# Patient Record
Sex: FEMALE | Race: OTHER | HISPANIC OR LATINO | Employment: FULL TIME | URBAN - METROPOLITAN AREA
[De-identification: names, ages, dates, MRNs, and addresses within clinical notes are randomized per-mention and may not be internally consistent; named-entity substitution may affect disease eponyms.]

---

## 2018-09-25 ENCOUNTER — HOSPITAL ENCOUNTER (EMERGENCY)
Facility: HOSPITAL | Age: 58
Discharge: HOME | End: 2018-09-25
Attending: EMERGENCY MEDICINE
Payer: OTHER MISCELLANEOUS

## 2018-09-25 VITALS
DIASTOLIC BLOOD PRESSURE: 61 MMHG | HEART RATE: 71 BPM | OXYGEN SATURATION: 98 % | WEIGHT: 165.38 LBS | TEMPERATURE: 98 F | SYSTOLIC BLOOD PRESSURE: 115 MMHG | RESPIRATION RATE: 18 BRPM

## 2018-09-25 DIAGNOSIS — Z51.89 VISIT FOR WOUND CHECK: Primary | ICD-10-CM

## 2018-09-25 PROCEDURE — 99283 EMERGENCY DEPT VISIT LOW MDM: CPT

## 2018-09-25 RX ORDER — LEVOTHYROXINE SODIUM 125 UG/1
125 TABLET ORAL DAILY
COMMUNITY
End: 2019-03-26

## 2018-09-25 RX ORDER — CLINDAMYCIN HYDROCHLORIDE 150 MG/1
300 CAPSULE ORAL 4 TIMES DAILY
Qty: 32 CAPSULE | Refills: 0 | Status: SHIPPED | OUTPATIENT
Start: 2018-09-25 | End: 2018-09-29

## 2018-09-25 NOTE — ED PROVIDER NOTES
Encounter Date: 9/25/2018  SCRIBE #1 NOTE: I, Azar An, am scribing for, and in the presence of,  Tyree Bill MD. I have scribed the entire note.        History     Chief Complaint   Patient presents with    Wound Check     cut middle finger on the left hand sunday        The patient is a 58 y.o. female with co-morbidities including: hypothyroidism who presents to the ED with a complaint of wound to middle finger stating about 2 days ago. The patient was using a cheese grater when she sliced her finger. She reports she has been using antibiotic cream and glue at home on the area. She was not evaluated by a physician at that time. Since the event she wanted a recheck of the area but has not had any significant pain, or any other complaints.            Review of patient's allergies indicates:  No Known Allergies  Past Medical History:   Diagnosis Date    Hypothyroidism      No past surgical history on file.  No family history on file.  Social History     Tobacco Use    Smoking status: Not on file   Substance Use Topics    Alcohol use: Not on file    Drug use: Not on file     Review of Systems   Constitutional: Negative for fever.   HENT: Negative for sore throat.    Respiratory: Negative for shortness of breath.    Cardiovascular: Negative for chest pain.   Gastrointestinal: Negative for nausea.   Genitourinary: Negative for dysuria.   Musculoskeletal: Negative for back pain.   Skin: Positive for wound. Negative for rash.   Neurological: Negative for weakness.   Hematological: Does not bruise/bleed easily.       Physical Exam     Initial Vitals [09/25/18 1236]   BP Pulse Resp Temp SpO2   115/61 71 18 97.7 °F (36.5 °C) 98 %      MAP       --         Physical Exam    Nursing note and vitals reviewed.  Constitutional: She appears well-developed and well-nourished.   HENT:   Head: Normocephalic and atraumatic.   Eyes: Conjunctivae are normal.   Musculoskeletal: Normal range of motion.   Neurological: She is  alert and oriented to person, place, and time. She has normal strength. No sensory deficit.   Skin: Skin is warm and dry.   healing irregular laceration to left middle finger no surrounding erythema or purulence healing   Psychiatric: She has a normal mood and affect. Her behavior is normal.         ED Course   Procedures  Labs Reviewed - No data to display       Imaging Results    None          Medical Decision Making:   Initial Assessment:   Patient presents for evaluation of wound on her finger that she cut several days ago.  She has been applying antibiotic ointment and use glue to close the wound at home.  The finger does not look infected.  Tetanus is up-to-date.  It appears to be healing well without signs of infection.  She will be placed on prophylactic antibiotics.  Return precautions discussed.  Wound care precautions discussed.  appropriately.                             Clinical Impression:   The encounter diagnosis was Visit for wound check.              I, Fly Doyle, personally performed the services described in this documentation. All medical record entries made by the scribe were at my direction and in my presence.  I have reviewed the chart and agree that the record reflects my personal performance and is accurate and complete. Tyree Bill MD.  10:25 PM 09/25/2018                 Tyree Bill MD  09/25/18 8402

## 2019-02-22 ENCOUNTER — DOCUMENTATION ONLY (OUTPATIENT)
Dept: FAMILY MEDICINE | Facility: CLINIC | Age: 59
End: 2019-02-22

## 2019-02-22 ENCOUNTER — OFFICE VISIT (OUTPATIENT)
Dept: FAMILY MEDICINE | Facility: CLINIC | Age: 59
End: 2019-02-22
Payer: COMMERCIAL

## 2019-02-22 ENCOUNTER — HOSPITAL ENCOUNTER (OUTPATIENT)
Dept: RADIOLOGY | Facility: CLINIC | Age: 59
Discharge: HOME | End: 2019-02-22
Attending: PHYSICIAN ASSISTANT
Payer: COMMERCIAL

## 2019-02-22 VITALS
HEIGHT: 62 IN | SYSTOLIC BLOOD PRESSURE: 113 MMHG | BODY MASS INDEX: 29.74 KG/M2 | HEART RATE: 58 BPM | DIASTOLIC BLOOD PRESSURE: 67 MMHG | TEMPERATURE: 98 F | WEIGHT: 161.63 LBS

## 2019-02-22 DIAGNOSIS — J20.9 BRONCHITIS WITH BRONCHOSPASM: ICD-10-CM

## 2019-02-22 DIAGNOSIS — J20.9 BRONCHITIS WITH BRONCHOSPASM: Primary | ICD-10-CM

## 2019-02-22 PROCEDURE — 99999 PR PBB SHADOW E&M-EST. PATIENT-LVL III: ICD-10-PCS | Mod: PBBFAC,,, | Performed by: PHYSICIAN ASSISTANT

## 2019-02-22 PROCEDURE — 3008F BODY MASS INDEX DOCD: CPT | Mod: CPTII,S$GLB,, | Performed by: PHYSICIAN ASSISTANT

## 2019-02-22 PROCEDURE — 99213 OFFICE O/P EST LOW 20 MIN: CPT | Mod: 25,S$GLB,, | Performed by: PHYSICIAN ASSISTANT

## 2019-02-22 PROCEDURE — 94640 PR INHAL RX, AIRWAY OBST/DX SPUTUM INDUCT: ICD-10-PCS | Mod: 59,S$GLB,, | Performed by: PHYSICIAN ASSISTANT

## 2019-02-22 PROCEDURE — 99213 PR OFFICE/OUTPT VISIT, EST, LEVL III, 20-29 MIN: ICD-10-PCS | Mod: 25,S$GLB,, | Performed by: PHYSICIAN ASSISTANT

## 2019-02-22 PROCEDURE — 96372 PR INJECTION,THERAP/PROPH/DIAG2ST, IM OR SUBCUT: ICD-10-PCS | Mod: S$GLB,,, | Performed by: PHYSICIAN ASSISTANT

## 2019-02-22 PROCEDURE — 71046 X-RAY EXAM CHEST 2 VIEWS: CPT | Mod: TC,FY,PO

## 2019-02-22 PROCEDURE — 99999 PR PBB SHADOW E&M-EST. PATIENT-LVL III: CPT | Mod: PBBFAC,,, | Performed by: PHYSICIAN ASSISTANT

## 2019-02-22 PROCEDURE — 3008F PR BODY MASS INDEX (BMI) DOCUMENTED: ICD-10-PCS | Mod: CPTII,S$GLB,, | Performed by: PHYSICIAN ASSISTANT

## 2019-02-22 PROCEDURE — 94640 AIRWAY INHALATION TREATMENT: CPT | Mod: 59,S$GLB,, | Performed by: PHYSICIAN ASSISTANT

## 2019-02-22 PROCEDURE — 71046 X-RAY EXAM CHEST 2 VIEWS: CPT | Mod: 26,,, | Performed by: RADIOLOGY

## 2019-02-22 PROCEDURE — 96372 THER/PROPH/DIAG INJ SC/IM: CPT | Mod: S$GLB,,, | Performed by: PHYSICIAN ASSISTANT

## 2019-02-22 PROCEDURE — 71046 XR CHEST PA AND LATERAL: ICD-10-PCS | Mod: 26,,, | Performed by: RADIOLOGY

## 2019-02-22 RX ORDER — BETAMETHASONE SODIUM PHOSPHATE AND BETAMETHASONE ACETATE 3; 3 MG/ML; MG/ML
6 INJECTION, SUSPENSION INTRA-ARTICULAR; INTRALESIONAL; INTRAMUSCULAR; SOFT TISSUE
Status: COMPLETED | OUTPATIENT
Start: 2019-02-22 | End: 2019-02-22

## 2019-02-22 RX ORDER — METHYLPREDNISOLONE 4 MG/1
TABLET ORAL
Qty: 1 PACKAGE | Refills: 0 | Status: SHIPPED | OUTPATIENT
Start: 2019-02-22 | End: 2019-03-22 | Stop reason: ALTCHOICE

## 2019-02-22 RX ORDER — ALBUTEROL SULFATE 1.25 MG/3ML
1.25 SOLUTION RESPIRATORY (INHALATION)
Status: DISCONTINUED | OUTPATIENT
Start: 2019-02-22 | End: 2019-02-22

## 2019-02-22 RX ORDER — ALBUTEROL SULFATE 90 UG/1
2 AEROSOL, METERED RESPIRATORY (INHALATION) EVERY 6 HOURS PRN
Qty: 18 G | Refills: 0 | Status: SHIPPED | OUTPATIENT
Start: 2019-02-22 | End: 2019-03-22 | Stop reason: ALTCHOICE

## 2019-02-22 RX ORDER — ALBUTEROL SULFATE 0.83 MG/ML
2.5 SOLUTION RESPIRATORY (INHALATION)
Status: COMPLETED | OUTPATIENT
Start: 2019-02-22 | End: 2019-02-22

## 2019-02-22 RX ORDER — OXYMETAZOLINE HCL 0.05 %
2 SPRAY, NON-AEROSOL (ML) NASAL 2 TIMES DAILY
COMMUNITY
End: 2019-03-22 | Stop reason: ALTCHOICE

## 2019-02-22 RX ORDER — AMOXICILLIN AND CLAVULANATE POTASSIUM 500; 125 MG/1; MG/1
1 TABLET, FILM COATED ORAL 2 TIMES DAILY
Refills: 1 | COMMUNITY
Start: 2019-02-18 | End: 2019-03-22 | Stop reason: ALTCHOICE

## 2019-02-22 RX ADMIN — ALBUTEROL SULFATE 2.5 MG: 0.83 SOLUTION RESPIRATORY (INHALATION) at 04:02

## 2019-02-22 RX ADMIN — BETAMETHASONE SODIUM PHOSPHATE AND BETAMETHASONE ACETATE 6 MG: 3; 3 INJECTION, SUSPENSION INTRA-ARTICULAR; INTRALESIONAL; INTRAMUSCULAR; SOFT TISSUE at 03:02

## 2019-02-22 NOTE — PROGRESS NOTES
Subjective:       Patient ID: Akua Anderson is a 58 y.o. female.    Chief Complaint: Cough    Cough   This is a new problem. The current episode started 1 to 4 weeks ago. The problem has been unchanged. The problem occurs constantly. The cough is productive of purulent sputum. Associated symptoms include a fever, nasal congestion, postnasal drip, rhinorrhea, shortness of breath and wheezing. Pertinent negatives include no chest pain, chills, ear congestion, headaches, rash, sore throat, sweats or weight loss. Nothing aggravates the symptoms. She has tried nothing for the symptoms.      Patient   Review of Systems   Constitutional: Positive for fever. Negative for activity change, appetite change, chills, diaphoresis, fatigue and weight loss.   HENT: Positive for postnasal drip and rhinorrhea. Negative for congestion and sore throat.    Respiratory: Positive for cough, shortness of breath and wheezing. Negative for stridor.    Cardiovascular: Negative.  Negative for chest pain, palpitations and leg swelling.   Gastrointestinal: Negative for abdominal pain, blood in stool, constipation, diarrhea, nausea and vomiting.   Genitourinary: Negative for dysuria, frequency, hematuria and urgency.   Musculoskeletal: Negative.    Skin: Negative.  Negative for color change and rash.   Neurological: Negative for dizziness, syncope and headaches.   Psychiatric/Behavioral: Negative for agitation, behavioral problems and confusion.       Objective:      Physical Exam   Constitutional: Vital signs are normal. She appears well-developed and well-nourished. No distress.   HENT:   Head: Normocephalic and atraumatic.   Right Ear: Hearing, tympanic membrane, external ear and ear canal normal.   Left Ear: Hearing, tympanic membrane, external ear and ear canal normal.   Nose: Mucosal edema and rhinorrhea present.   Mouth/Throat: Uvula is midline and mucous membranes are normal. Posterior oropharyngeal erythema present.    Cardiovascular: Normal rate, regular rhythm, S1 normal, S2 normal and normal heart sounds. Exam reveals no gallop.   No murmur heard.  Pulses:       Radial pulses are 2+ on the right side, and 2+ on the left side.   Pulmonary/Chest: Effort normal. No respiratory distress. She has wheezes in the right upper field and the left upper field. She has no rhonchi.   Skin: Skin is warm and dry. She is not diaphoretic.   Appropriate skin turgor   Psychiatric: She has a normal mood and affect. Her speech is normal and behavior is normal. Judgment and thought content normal. Cognition and memory are normal.       Assessment:       1. Bronchitis with bronchospasm        Plan:       Akua was seen today for cough.    Diagnoses and all orders for this visit:    Bronchitis with bronchospasm  -     X-Ray Chest PA And Lateral; Future  -     Discontinue: albuterol nebulizer solution 1.25 mg  -     betamethasone acetate-betamethasone sodium phosphate injection 6 mg  -     albuterol nebulizer solution 2.5 mg  Augmentin 875/125: take 1 tablet every 12 hours x 10 days  Medrol Dose Pack: take as directed.  Return to clinic if symptoms worsen or do not improve with treatment    Patient will return for follow-up to discuss health maintenance and establishing care.

## 2019-02-22 NOTE — PROGRESS NOTES
Pre-Visit Chart Review  For Appointment Scheduled on 02/22/19    There are no preventive care reminders to display for this patient.

## 2019-03-22 ENCOUNTER — OFFICE VISIT (OUTPATIENT)
Dept: FAMILY MEDICINE | Facility: CLINIC | Age: 59
End: 2019-03-22
Payer: COMMERCIAL

## 2019-03-22 ENCOUNTER — DOCUMENTATION ONLY (OUTPATIENT)
Dept: FAMILY MEDICINE | Facility: CLINIC | Age: 59
End: 2019-03-22

## 2019-03-22 VITALS
SYSTOLIC BLOOD PRESSURE: 110 MMHG | WEIGHT: 159.38 LBS | HEART RATE: 71 BPM | HEIGHT: 62 IN | DIASTOLIC BLOOD PRESSURE: 64 MMHG | BODY MASS INDEX: 29.33 KG/M2 | OXYGEN SATURATION: 97 % | TEMPERATURE: 98 F

## 2019-03-22 DIAGNOSIS — Z11.59 NEED FOR HEPATITIS C SCREENING TEST: ICD-10-CM

## 2019-03-22 DIAGNOSIS — E03.9 HYPOTHYROIDISM, UNSPECIFIED TYPE: ICD-10-CM

## 2019-03-22 DIAGNOSIS — E66.3 OVERWEIGHT: ICD-10-CM

## 2019-03-22 DIAGNOSIS — J30.1 SEASONAL ALLERGIC RHINITIS DUE TO POLLEN: Primary | ICD-10-CM

## 2019-03-22 DIAGNOSIS — Z13.6 ENCOUNTER FOR LIPID SCREENING FOR CARDIOVASCULAR DISEASE: ICD-10-CM

## 2019-03-22 DIAGNOSIS — Z13.220 ENCOUNTER FOR LIPID SCREENING FOR CARDIOVASCULAR DISEASE: ICD-10-CM

## 2019-03-22 PROCEDURE — 99214 OFFICE O/P EST MOD 30 MIN: CPT | Mod: S$GLB,,, | Performed by: PHYSICIAN ASSISTANT

## 2019-03-22 PROCEDURE — 99999 PR PBB SHADOW E&M-EST. PATIENT-LVL III: CPT | Mod: PBBFAC,,, | Performed by: PHYSICIAN ASSISTANT

## 2019-03-22 PROCEDURE — 99999 PR PBB SHADOW E&M-EST. PATIENT-LVL III: ICD-10-PCS | Mod: PBBFAC,,, | Performed by: PHYSICIAN ASSISTANT

## 2019-03-22 PROCEDURE — 3008F BODY MASS INDEX DOCD: CPT | Mod: CPTII,S$GLB,, | Performed by: PHYSICIAN ASSISTANT

## 2019-03-22 PROCEDURE — 3008F PR BODY MASS INDEX (BMI) DOCUMENTED: ICD-10-PCS | Mod: CPTII,S$GLB,, | Performed by: PHYSICIAN ASSISTANT

## 2019-03-22 PROCEDURE — 99214 PR OFFICE/OUTPT VISIT, EST, LEVL IV, 30-39 MIN: ICD-10-PCS | Mod: S$GLB,,, | Performed by: PHYSICIAN ASSISTANT

## 2019-03-22 RX ORDER — LORATADINE 10 MG/1
10 TABLET ORAL DAILY PRN
COMMUNITY

## 2019-03-22 NOTE — PROGRESS NOTES
Subjective:       Patient ID: Akua Anderson is a 58 y.o. female.    Chief Complaint: Follow-up    HPI   Patient is a 58 year old female presenting to the clinic for several concerns. She presents with her son whom is the  as she speaks little English. Patient agrees to this and does not wish to have a . Patient reports she is concerned that she has had waxing and waning cough, runny nose, sinus congestion for over 1 month. Patient was initially evaluated at  in early February. She followed up with our clinic 2/22/19 for similar concerned and what dx with bronchitis. Patient did undergo antibiotic therapy, medrol dose pack, steroid injection, and given albuteorl HFA. Patient reports that she did improve after this treatment. However, she recently went on a cruise and felt poorly again after returning home. She has been taking claritin off and on with some relief. She denies any fever. She has had some mild wheezing with taking deep breaths.    Of note, patient would like to get routine labwork done. She reports seeing ob/gyn in [place] in January and had her mammogram and pap smear done. She reports colonoscopy done about 3 years back in [place]. There is some confusion as when she was supposed to repeat this. She goes back to [place] in July.     Review of Systems   Constitutional: Negative for activity change, appetite change, chills, diaphoresis, fatigue and fever.   HENT: Positive for congestion, sinus pressure and sinus pain. Negative for postnasal drip, rhinorrhea, sore throat, tinnitus and trouble swallowing.    Respiratory: Positive for cough. Negative for shortness of breath and wheezing.    Cardiovascular: Negative.  Negative for chest pain.   Gastrointestinal: Negative for abdominal pain, blood in stool, constipation, diarrhea, nausea and vomiting.   Genitourinary: Negative for dysuria, frequency, hematuria and urgency.   Musculoskeletal: Negative.    Skin: Negative.  Negative  for color change and rash.   Neurological: Negative for dizziness and syncope.   Psychiatric/Behavioral: Negative for agitation, behavioral problems and confusion.       Objective:      Physical Exam   Constitutional: Vital signs are normal. She appears well-developed and well-nourished. No distress.   HENT:   Head: Normocephalic and atraumatic.   Right Ear: Hearing, tympanic membrane, external ear and ear canal normal.   Left Ear: Hearing, tympanic membrane, external ear and ear canal normal.   Nose: Rhinorrhea present. Right sinus exhibits maxillary sinus tenderness and frontal sinus tenderness. Left sinus exhibits maxillary sinus tenderness and frontal sinus tenderness.   Mouth/Throat: Uvula is midline and mucous membranes are normal. Posterior oropharyngeal erythema present.   Cardiovascular: Normal rate, regular rhythm, S1 normal, S2 normal and normal heart sounds. Exam reveals no gallop.   No murmur heard.  Pulses:       Radial pulses are 2+ on the right side, and 2+ on the left side.   <2sec cap refill fingers bilat     Pulmonary/Chest: Effort normal and breath sounds normal. No respiratory distress. She has no wheezes. She has no rhonchi.   Skin: Skin is warm and dry. She is not diaphoretic.   Appropriate skin turgor   Psychiatric: She has a normal mood and affect. Her speech is normal and behavior is normal. Judgment and thought content normal. Cognition and memory are normal.       Assessment:       1. Seasonal allergic rhinitis due to pollen    2. Hypothyroidism, unspecified type    3. Encounter for lipid screening for cardiovascular disease    4. Need for hepatitis C screening test    5. Overweight        Plan:       Akua was seen today for follow-up.    Diagnoses and all orders for this visit:    Seasonal allergic rhinitis due to pollen  Likely related to pollen; patient is somewhat new to the area. She is from [place].  I also feel like there could be a seasonal asthma component, but normal on exam  today.  If cough or wheezing persists, may need PFTs  She has albuteorl inhaler every 4-6 hours PRN    Hypothyroidism, unspecified type  -     CBC auto differential; Future  -     Comprehensive metabolic panel; Future  -     TSH; Future    Encounter for lipid screening for cardiovascular disease  -     CBC auto differential; Future  -     Comprehensive metabolic panel; Future  -     Lipid panel; Future    Need for hepatitis C screening test  -     Hepatitis C antibody; Future    Overweight  -     CBC auto differential; Future  -     Comprehensive metabolic panel; Future  -     TSH; Future        Patient readiness: acceptance and barriers:none    During the course of the visit the patient was educated and counseled about the following:     Obesity:   Regular aerobic exercise program discussed.    Goals: Obesity: Reduce calorie intake and BMI    Did patient meet goals/outcomes: No    The following self management tools provided: declined    Patient Instructions (the written plan) was given to the patient/family.     Time spent with patient: 30 minutes    Barriers to medications present (no )    Adverse reactions to current medications (no)    Over the counter medications reviewed (Yes)    Est care with Dr. Galarza

## 2019-03-22 NOTE — PROGRESS NOTES
Pre-Visit Chart Review  For Appointment Scheduled on 03/22/19    Health Maintenance Due   Topic Date Due    Hepatitis C Screening  1960    Lipid Panel  1960    TETANUS VACCINE  04/30/1977    Pap Smear with HPV Cotest  04/30/1980    Mammogram  04/30/1999    Colonoscopy  04/30/2009    Influenza Vaccine  08/01/2018

## 2019-03-25 ENCOUNTER — LAB VISIT (OUTPATIENT)
Dept: LAB | Facility: HOSPITAL | Age: 59
End: 2019-03-25
Attending: PHYSICIAN ASSISTANT
Payer: COMMERCIAL

## 2019-03-25 DIAGNOSIS — Z11.59 NEED FOR HEPATITIS C SCREENING TEST: ICD-10-CM

## 2019-03-25 DIAGNOSIS — Z13.220 ENCOUNTER FOR LIPID SCREENING FOR CARDIOVASCULAR DISEASE: ICD-10-CM

## 2019-03-25 DIAGNOSIS — Z13.6 ENCOUNTER FOR LIPID SCREENING FOR CARDIOVASCULAR DISEASE: ICD-10-CM

## 2019-03-25 DIAGNOSIS — E66.3 OVERWEIGHT: ICD-10-CM

## 2019-03-25 DIAGNOSIS — E03.9 HYPOTHYROIDISM, UNSPECIFIED TYPE: ICD-10-CM

## 2019-03-25 LAB
ALBUMIN SERPL BCP-MCNC: 3.8 G/DL (ref 3.5–5.2)
ALP SERPL-CCNC: 106 U/L (ref 55–135)
ALT SERPL W/O P-5'-P-CCNC: 37 U/L (ref 10–44)
ANION GAP SERPL CALC-SCNC: 9 MMOL/L (ref 8–16)
AST SERPL-CCNC: 30 U/L (ref 10–40)
BASOPHILS # BLD AUTO: 0.04 K/UL (ref 0–0.2)
BASOPHILS NFR BLD: 0.6 % (ref 0–1.9)
BILIRUB SERPL-MCNC: 0.9 MG/DL (ref 0.1–1)
BUN SERPL-MCNC: 12 MG/DL (ref 6–20)
CALCIUM SERPL-MCNC: 9.8 MG/DL (ref 8.7–10.5)
CHLORIDE SERPL-SCNC: 107 MMOL/L (ref 95–110)
CHOLEST SERPL-MCNC: 226 MG/DL (ref 120–199)
CHOLEST/HDLC SERPL: 5.3 {RATIO} (ref 2–5)
CO2 SERPL-SCNC: 25 MMOL/L (ref 23–29)
CREAT SERPL-MCNC: 0.8 MG/DL (ref 0.5–1.4)
DIFFERENTIAL METHOD: ABNORMAL
EOSINOPHIL # BLD AUTO: 0.3 K/UL (ref 0–0.5)
EOSINOPHIL NFR BLD: 4.1 % (ref 0–8)
ERYTHROCYTE [DISTWIDTH] IN BLOOD BY AUTOMATED COUNT: 13.7 % (ref 11.5–14.5)
EST. GFR  (AFRICAN AMERICAN): >60 ML/MIN/1.73 M^2
EST. GFR  (NON AFRICAN AMERICAN): >60 ML/MIN/1.73 M^2
GLUCOSE SERPL-MCNC: 99 MG/DL (ref 70–110)
HCT VFR BLD AUTO: 41.9 % (ref 37–48.5)
HCV AB SERPL QL IA: NEGATIVE
HDLC SERPL-MCNC: 43 MG/DL (ref 40–75)
HDLC SERPL: 19 % (ref 20–50)
HGB BLD-MCNC: 13.2 G/DL (ref 12–16)
IMM GRANULOCYTES # BLD AUTO: 0.02 K/UL (ref 0–0.04)
IMM GRANULOCYTES NFR BLD AUTO: 0.3 % (ref 0–0.5)
LDLC SERPL CALC-MCNC: 148.8 MG/DL (ref 63–159)
LYMPHOCYTES # BLD AUTO: 1.6 K/UL (ref 1–4.8)
LYMPHOCYTES NFR BLD: 25 % (ref 18–48)
MCH RBC QN AUTO: 27.8 PG (ref 27–31)
MCHC RBC AUTO-ENTMCNC: 31.5 G/DL (ref 32–36)
MCV RBC AUTO: 88 FL (ref 82–98)
MONOCYTES # BLD AUTO: 0.6 K/UL (ref 0.3–1)
MONOCYTES NFR BLD: 8.6 % (ref 4–15)
NEUTROPHILS # BLD AUTO: 4 K/UL (ref 1.8–7.7)
NEUTROPHILS NFR BLD: 61.4 % (ref 38–73)
NONHDLC SERPL-MCNC: 183 MG/DL
NRBC BLD-RTO: 0 /100 WBC
PLATELET # BLD AUTO: 361 K/UL (ref 150–350)
PMV BLD AUTO: 10.9 FL (ref 9.2–12.9)
POTASSIUM SERPL-SCNC: 4.2 MMOL/L (ref 3.5–5.1)
PROT SERPL-MCNC: 7.5 G/DL (ref 6–8.4)
RBC # BLD AUTO: 4.74 M/UL (ref 4–5.4)
SODIUM SERPL-SCNC: 141 MMOL/L (ref 136–145)
T4 FREE SERPL-MCNC: 1.62 NG/DL (ref 0.71–1.51)
TRIGL SERPL-MCNC: 171 MG/DL (ref 30–150)
TSH SERPL DL<=0.005 MIU/L-ACNC: 0.1 UIU/ML (ref 0.4–4)
WBC # BLD AUTO: 6.53 K/UL (ref 3.9–12.7)

## 2019-03-25 PROCEDURE — 84439 ASSAY OF FREE THYROXINE: CPT

## 2019-03-25 PROCEDURE — 80053 COMPREHEN METABOLIC PANEL: CPT

## 2019-03-25 PROCEDURE — 36415 COLL VENOUS BLD VENIPUNCTURE: CPT | Mod: PO

## 2019-03-25 PROCEDURE — 86803 HEPATITIS C AB TEST: CPT

## 2019-03-25 PROCEDURE — 80061 LIPID PANEL: CPT

## 2019-03-25 PROCEDURE — 84443 ASSAY THYROID STIM HORMONE: CPT

## 2019-03-25 PROCEDURE — 85025 COMPLETE CBC W/AUTO DIFF WBC: CPT

## 2019-03-26 ENCOUNTER — TELEPHONE (OUTPATIENT)
Dept: FAMILY MEDICINE | Facility: CLINIC | Age: 59
End: 2019-03-26

## 2019-03-26 DIAGNOSIS — R79.89 DECREASED THYROID STIMULATING HORMONE (TSH) LEVEL: Primary | ICD-10-CM

## 2019-03-26 RX ORDER — LEVOTHYROXINE SODIUM 112 UG/1
112 TABLET ORAL DAILY
Qty: 90 TABLET | Refills: 0 | Status: SHIPPED | OUTPATIENT
Start: 2019-03-26 | End: 2020-03-25

## 2019-03-26 NOTE — TELEPHONE ENCOUNTER
----- Message from Yina Bacon sent at 3/26/2019 12:29 PM CDT -----  Type:  Patient Returning Call    Who Called:  Patient  Who Left Message for Patient: NA  Does the patient know what this is regarding?:  Lab test results  Best Call Back Number:  [phone]  Additional Information: Okay to leave voicemail with information

## 2019-08-21 DIAGNOSIS — M25.562 LEFT KNEE PAIN, UNSPECIFIED CHRONICITY: Primary | ICD-10-CM

## 2019-08-22 ENCOUNTER — OFFICE VISIT (OUTPATIENT)
Dept: ORTHOPEDICS | Facility: CLINIC | Age: 59
End: 2019-08-22
Payer: COMMERCIAL

## 2019-08-22 ENCOUNTER — HOSPITAL ENCOUNTER (OUTPATIENT)
Dept: RADIOLOGY | Facility: HOSPITAL | Age: 59
Discharge: HOME | End: 2019-08-22
Attending: ORTHOPAEDIC SURGERY
Payer: COMMERCIAL

## 2019-08-22 VITALS
SYSTOLIC BLOOD PRESSURE: 111 MMHG | BODY MASS INDEX: 29.26 KG/M2 | WEIGHT: 159 LBS | HEIGHT: 62 IN | DIASTOLIC BLOOD PRESSURE: 63 MMHG | HEART RATE: 79 BPM

## 2019-08-22 DIAGNOSIS — M25.562 LEFT KNEE PAIN, UNSPECIFIED CHRONICITY: ICD-10-CM

## 2019-08-22 DIAGNOSIS — S83.242A TEAR OF MEDIAL MENISCUS OF LEFT KNEE, CURRENT, INITIAL ENCOUNTER: Primary | ICD-10-CM

## 2019-08-22 PROCEDURE — 3008F PR BODY MASS INDEX (BMI) DOCUMENTED: ICD-10-PCS | Mod: CPTII,S$GLB,, | Performed by: ORTHOPAEDIC SURGERY

## 2019-08-22 PROCEDURE — 73562 X-RAY EXAM OF KNEE 3: CPT | Mod: TC,PN,RT

## 2019-08-22 PROCEDURE — 73562 XR KNEE ORTHO LEFT WITH FLEXION: ICD-10-PCS | Mod: 26,59,RT, | Performed by: RADIOLOGY

## 2019-08-22 PROCEDURE — 99204 PR OFFICE/OUTPT VISIT, NEW, LEVL IV, 45-59 MIN: ICD-10-PCS | Mod: S$GLB,,, | Performed by: ORTHOPAEDIC SURGERY

## 2019-08-22 PROCEDURE — 99999 PR PBB SHADOW E&M-EST. PATIENT-LVL III: ICD-10-PCS | Mod: PBBFAC,,, | Performed by: ORTHOPAEDIC SURGERY

## 2019-08-22 PROCEDURE — 73562 X-RAY EXAM OF KNEE 3: CPT | Mod: 26,59,RT, | Performed by: RADIOLOGY

## 2019-08-22 PROCEDURE — 73564 X-RAY EXAM KNEE 4 OR MORE: CPT | Mod: 26,LT,, | Performed by: RADIOLOGY

## 2019-08-22 PROCEDURE — 3008F BODY MASS INDEX DOCD: CPT | Mod: CPTII,S$GLB,, | Performed by: ORTHOPAEDIC SURGERY

## 2019-08-22 PROCEDURE — 99204 OFFICE O/P NEW MOD 45 MIN: CPT | Mod: S$GLB,,, | Performed by: ORTHOPAEDIC SURGERY

## 2019-08-22 PROCEDURE — 99999 PR PBB SHADOW E&M-EST. PATIENT-LVL III: CPT | Mod: PBBFAC,,, | Performed by: ORTHOPAEDIC SURGERY

## 2019-08-22 PROCEDURE — 73564 XR KNEE ORTHO LEFT WITH FLEXION: ICD-10-PCS | Mod: 26,LT,, | Performed by: RADIOLOGY

## 2019-08-22 NOTE — H&P (VIEW-ONLY)
CC:  59-year-old female presents for evaluation of left knee pain.  The patient has had pain in the left knee for about 2 weeks.  She states that she pivoted to turn with her left foot planted and felt and heard a pop in the back of her knee.  She has had pain in the knee since that time this caused her to limp.  She rates her pain as a 7/10.  The knee has continued to pop since initial injury.  It also mechelle and gives way due to the pain.  She denies any locking of the knee.    ROS:    Constitution: Denies chills, fever, and sweats.  HENT: Denies headaches or blurry vision.  Cardiovascular: Denies chest pain or irregular heart beat.  Respiratory: Denies cough or shortness of breath.  Gastrointestinal: Denies abdominal pain, nausea, or vomiting.  Genitourinary:  Denies urinary incontinence, bladder and kidney issues  Musculoskeletal:  Denies muscle cramps.  Positive for left knee pain with popping and buckling of the knee.  Neurological: Denies dizziness or focal weakness.  Psychiatric/Behavioral: Normal mental status.  Hematologic/Lymphatic: Denies bleeding problem or easy bruising/bleeding.  Skin: Denies rash or suspicious lesions.    Physical examination     Gen - No acute distress   Eyes - Extraoccular motions intact, pupils equally round and reactive to light and accommodation   ENT - normocephalic, atruamtic, oropharynx clear   Neck - Supple, no abnormal masses   Cardiovascular - regular rate and rhythm   Pulmonary - clear to auscultation bilaterally   Abdomen - soft, non-tender, non-distended, positive bowel sounds   Psych - The patient is alert and oriented x3 with normal mood and affect    Examination of the Left Lower Extremity:     Motor function is intact distally EHL/FHL/TA/shamika   +2 dorsalis pedis and posterior tibial pulses   Sensation to light touch intact distally dorsal, plantar, and first web space     Examination of the Left knee:    ROM 0 - 150   Effusion positive  Tenderness to palpation at  the joint line positive, mostly over the medial and posterior medial aspect of the left  Pain during range of motion positive  Crepitation during range of motion negative     positive increased pain noted with flexion past 90   positive antalgic gait noted   negative Lachman's Test   negative Anterior Drawer Test   negative Posterior Drawer Test   positive McMurrays Test   positive Disco Test   positive Varus/Valgus instability    X-rays were examined and personally reviewed by me.  The joint space is well maintained.  There is no acute fractures. There is no signs of osteoarthritis.  There does appear to be an effusion on the lateral view.    Dx:  Tear of the medial meniscus of the left knee.    Plan:  Recommendations for left knee arthroscopy with partial medial meniscectomy versus repair.  Risks and benefits were explained and the patient verbalized understanding and wishes to proceed.  Will schedule that for the next available date that is convenient for her.

## 2019-08-22 NOTE — LETTER
August 22, 2019      [facility]            [facility] [place]  [facility] [address]  Phone: [phone]          Patient: Akua Anderson   MR Number: [id number]   YOB: 1960   Date of Visit: 8/22/2019       Dear [facility] :    Thank you for referring Akua Anderson to me for evaluation. Attached you will find relevant portions of my assessment and plan of care.    If you have questions, please do not hesitate to call me. I look forward to following Akua Anderson along with you.    Sincerely,    Nahum Espinosa II, MD    Enclosure  CC:  No Recipients    If you would like to receive this communication electronically, please contact [email or web address] or [phone] to request more information on [employer] access.    For providers and/or their staff who would like to refer a patient to [facility], please contact us through our one-stop-shop provider referral line, [facility] , at [phone].    If you feel you have received this communication in error or would no longer like to receive these types of communications, please e-mail [email or web address]

## 2019-08-28 ENCOUNTER — TELEPHONE (OUTPATIENT)
Dept: ORTHOPEDICS | Facility: CLINIC | Age: 59
End: 2019-08-28

## 2019-08-28 DIAGNOSIS — S83.242A TEAR OF MEDIAL MENISCUS OF LEFT KNEE, CURRENT, INITIAL ENCOUNTER: Primary | ICD-10-CM

## 2019-08-28 NOTE — TELEPHONE ENCOUNTER
Left message. Surgery off calender until MRI results are in. Able to add to 09/04/19 or 09/06/19. Elvi Garrido LPN

## 2019-08-28 NOTE — TELEPHONE ENCOUNTER
----- Message from Diamond Pearson sent at 8/28/2019  3:28 PM CDT -----  Contact: Daughter in law / Elvi Valero / [phone]  Elvi is returning the nurse's call.  She wants to know if the knee scope is still scheduled for this Friday or if it needs to be rescheduled for next Friday depending on how quickly you get the MRI results from tomorrow.  Please call Elvi.

## 2019-08-29 ENCOUNTER — HOSPITAL ENCOUNTER (OUTPATIENT)
Dept: RADIOLOGY | Facility: HOSPITAL | Age: 59
Discharge: HOME | End: 2019-08-29
Attending: ORTHOPAEDIC SURGERY
Payer: COMMERCIAL

## 2019-08-29 ENCOUNTER — HOSPITAL ENCOUNTER (OUTPATIENT)
Dept: PREADMISSION TESTING | Facility: HOSPITAL | Age: 59
Discharge: HOME | End: 2019-08-29
Attending: ORTHOPAEDIC SURGERY
Payer: COMMERCIAL

## 2019-08-29 ENCOUNTER — TELEPHONE (OUTPATIENT)
Dept: ORTHOPEDICS | Facility: CLINIC | Age: 59
End: 2019-08-29

## 2019-08-29 VITALS — WEIGHT: 160 LBS | HEIGHT: 61 IN | BODY MASS INDEX: 30.21 KG/M2

## 2019-08-29 DIAGNOSIS — S83.242A TEAR OF MEDIAL MENISCUS OF LEFT KNEE, CURRENT, INITIAL ENCOUNTER: Primary | ICD-10-CM

## 2019-08-29 DIAGNOSIS — S83.242A TEAR OF MEDIAL MENISCUS OF LEFT KNEE, CURRENT, INITIAL ENCOUNTER: ICD-10-CM

## 2019-08-29 DIAGNOSIS — S83.242A TEAR OF MEDIAL MENISCUS OF LEFT KNEE, CURRENT, UNSPECIFIED TEAR TYPE, INITIAL ENCOUNTER: Primary | ICD-10-CM

## 2019-08-29 DIAGNOSIS — S83.232A COMPLEX TEAR OF MEDIAL MENISCUS OF LEFT KNEE AS CURRENT INJURY, INITIAL ENCOUNTER: ICD-10-CM

## 2019-08-29 LAB
ANION GAP SERPL CALC-SCNC: 6 MMOL/L (ref 8–16)
ANION GAP SERPL CALC-SCNC: 7 MMOL/L (ref 8–16)
BASOPHILS # BLD AUTO: 0.03 K/UL (ref 0–0.2)
BASOPHILS NFR BLD: 0.4 % (ref 0–1.9)
BUN SERPL-MCNC: 20 MG/DL (ref 6–20)
BUN SERPL-MCNC: 20 MG/DL (ref 6–20)
CALCIUM SERPL-MCNC: 10.3 MG/DL (ref 8.7–10.5)
CALCIUM SERPL-MCNC: 10.5 MG/DL (ref 8.7–10.5)
CHLORIDE SERPL-SCNC: 105 MMOL/L (ref 95–110)
CHLORIDE SERPL-SCNC: 106 MMOL/L (ref 95–110)
CO2 SERPL-SCNC: 30 MMOL/L (ref 23–29)
CO2 SERPL-SCNC: 31 MMOL/L (ref 23–29)
CREAT SERPL-MCNC: 0.8 MG/DL (ref 0.5–1.4)
CREAT SERPL-MCNC: 0.9 MG/DL (ref 0.5–1.4)
DIFFERENTIAL METHOD: ABNORMAL
EOSINOPHIL # BLD AUTO: 0.3 K/UL (ref 0–0.5)
EOSINOPHIL NFR BLD: 3.3 % (ref 0–8)
ERYTHROCYTE [DISTWIDTH] IN BLOOD BY AUTOMATED COUNT: 14.2 % (ref 11.5–14.5)
EST. GFR  (AFRICAN AMERICAN): >60 ML/MIN/1.73 M^2
EST. GFR  (AFRICAN AMERICAN): >60 ML/MIN/1.73 M^2
EST. GFR  (NON AFRICAN AMERICAN): >60 ML/MIN/1.73 M^2
EST. GFR  (NON AFRICAN AMERICAN): >60 ML/MIN/1.73 M^2
GLUCOSE SERPL-MCNC: 102 MG/DL (ref 70–110)
GLUCOSE SERPL-MCNC: 102 MG/DL (ref 70–110)
HCT VFR BLD AUTO: 45.5 % (ref 37–48.5)
HGB BLD-MCNC: 14.4 G/DL (ref 12–16)
IMM GRANULOCYTES # BLD AUTO: 0.02 K/UL (ref 0–0.04)
LYMPHOCYTES # BLD AUTO: 2.1 K/UL (ref 1–4.8)
LYMPHOCYTES NFR BLD: 24.8 % (ref 18–48)
MCH RBC QN AUTO: 27.8 PG (ref 27–31)
MCHC RBC AUTO-ENTMCNC: 31.6 G/DL (ref 32–36)
MCV RBC AUTO: 88 FL (ref 82–98)
MONOCYTES # BLD AUTO: 0.7 K/UL (ref 0.3–1)
MONOCYTES NFR BLD: 8 % (ref 4–15)
NEUTROPHILS # BLD AUTO: 5.2 K/UL (ref 1.8–7.7)
NEUTROPHILS NFR BLD: 63.3 % (ref 38–73)
NRBC BLD-RTO: 0 /100 WBC
PLATELET # BLD AUTO: 293 K/UL (ref 150–350)
PMV BLD AUTO: 10.6 FL (ref 9.2–12.9)
POTASSIUM SERPL-SCNC: 5.1 MMOL/L (ref 3.5–5.1)
POTASSIUM SERPL-SCNC: 5.2 MMOL/L (ref 3.5–5.1)
RBC # BLD AUTO: 5.18 M/UL (ref 4–5.4)
SODIUM SERPL-SCNC: 142 MMOL/L (ref 136–145)
SODIUM SERPL-SCNC: 143 MMOL/L (ref 136–145)
WBC # BLD AUTO: 8.27 K/UL (ref 3.9–12.7)

## 2019-08-29 PROCEDURE — 73721 MRI KNEE WITHOUT CONTRAST LEFT: ICD-10-PCS | Mod: 26,LT,, | Performed by: RADIOLOGY

## 2019-08-29 PROCEDURE — 80048 BASIC METABOLIC PNL TOTAL CA: CPT

## 2019-08-29 PROCEDURE — 93010 ELECTROCARDIOGRAM REPORT: CPT | Mod: ,,, | Performed by: INTERNAL MEDICINE

## 2019-08-29 PROCEDURE — 93005 ELECTROCARDIOGRAM TRACING: CPT

## 2019-08-29 PROCEDURE — 80048 BASIC METABOLIC PNL TOTAL CA: CPT | Mod: 91

## 2019-08-29 PROCEDURE — 93010 EKG 12-LEAD: ICD-10-PCS | Mod: ,,, | Performed by: INTERNAL MEDICINE

## 2019-08-29 PROCEDURE — 99900104 DSU ONLY-NO CHARGE-EA ADD'L HR (STAT)

## 2019-08-29 PROCEDURE — 85025 COMPLETE CBC W/AUTO DIFF WBC: CPT

## 2019-08-29 PROCEDURE — 73721 MRI JNT OF LWR EXTRE W/O DYE: CPT | Mod: TC,LT

## 2019-08-29 PROCEDURE — 73721 MRI JNT OF LWR EXTRE W/O DYE: CPT | Mod: 26,LT,, | Performed by: RADIOLOGY

## 2019-08-29 PROCEDURE — 99900103 DSU ONLY-NO CHARGE-INITIAL HR (STAT)

## 2019-08-29 PROCEDURE — 36415 COLL VENOUS BLD VENIPUNCTURE: CPT

## 2019-08-29 RX ORDER — CEFAZOLIN SODIUM 2 G/50ML
2 SOLUTION INTRAVENOUS
Status: CANCELLED | OUTPATIENT
Start: 2019-08-29

## 2019-08-29 RX ORDER — MUPIROCIN 20 MG/G
OINTMENT TOPICAL
Status: CANCELLED | OUTPATIENT
Start: 2019-08-29

## 2019-08-29 NOTE — DISCHARGE INSTRUCTIONS
To confirm, Your doctor has instructed you that surgery is scheduled for: 9/4/19   Araseli  [phone]    Please report to [facility] the morning of surgery. You must check-in and receive a wristband before going to your procedure.    Pre-Op will call the afternoon prior to surgery between 1:00 and 6:00 PM with the final arrival time.  Phone number: [phone]    PLEASE NOTE:  The surgery schedule has many variables which may affect the time of your surgery case.  Family members should be available if your surgery time changes.  Plan to be here the day of your procedure between 4-6 hours.    MEDICATIONS:  TAKE ONLY THESE MEDICATIONS WITH A SMALL SIP OF WATER THE MORNING OF YOUR PROCEDURE:    Levothyroxine,      Claritin - if needed    DO NOT TAKE THESE MEDICATIONS 5-7 DAYS PRIOR to your procedure or per your surgeon's request: ASPIRIN, ALEVE, ADVIL, IBUPROFEN, FISH OIL VITAMIN E, HERBALS  (May take Tylenol)                                    Glucosamine - last dose -  8/29/19    ONLY if you are prescribed any types of blood thinners such as:  Aspirin, Coumadin, Plavix, Pradaxa, Xarelto, Aggrenox, Effient, Eliquis, Savasya, Brilinta, or any other, ask your surgeon whether you should stop taking them and how long before surgery you should stop.  You may also need to verify with the prescribing physician if it is ok to stop your medication.      INSTRUCTIONS IMPORTANT!!  · Do not eat or drink anything between midnight and the time of your procedure- this includes gum, mints, and candy.  · Do not smoke or drink alcoholic beverages 24 hours prior to your procedure.  · Shower the night before AND the morning of your procedure with a Chlorhexidine wash such as Hibiclens or Dial antibacterial soap from the neck down.  Do not get it on your face or in your eyes.  You may use your own shampoo and face wash. This helps your skin to be as bacteria free as possible.    · If you wear contact  lenses, dentures, hearing aids or glasses, bring a container to put them in during surgery and give to a family member for safe keeping.  Please leave all jewelry, piercing's and valuables at home.   · DO NOT remove hair from the surgery site.  Do not shave the incision site unless you are given specific instructions to do so.    · ONLY if you have been diagnosed with sleep apnea please bring your C-PAP machine.  · ONLY if you wear home oxygen please bring your portable oxygen tank the day of your procedure.  · ONLY if you have a history of OPEN HEART SURGERY you will need a clearance from your Cardiologist per Anesthesia.      · ONLY for patients requiring bowel prep, written instructions will be given by your doctor's office.  · ONLY if you have a neuro stimulator, please bring the controller with you the morning of surgery  · ONLY if a type and screen test is needed before surgery, please return:  · If your doctor has scheduled you for an overnight stay, bring a small overnight bag with any personal items you need.  · Make arrangements in advance for transportation home by a responsible adult.  It is not safe to drive a vehicle during the 24 hours after anesthesia.      · Visiting hours are 10:00AM to 8:30PM.  For the safety of all patients, visitors under the age of 12 are not allowed above the first floor of the hospital.    · All [facility] facilities and properties are tobacco free.  Smoking is NOT allowed.       If you have any questions about these instructions, call Pre-Op Admit  Nursing at [phone] or the Pre-Op Day Surgery Unit at [phone].

## 2019-08-29 NOTE — PRE-PROCEDURE INSTRUCTIONS
Pt. Speaks [place], but can understand English.  Daughter with be with Mother who speaks [place].  Pt was asked if she needed an , but denied needing one.

## 2019-08-29 NOTE — TELEPHONE ENCOUNTER
----- Message from Nayla Mcclure MA sent at 8/29/2019  8:21 AM CDT -----  Contact: pt   Calling to schedule surgery   Call back

## 2019-08-30 ENCOUNTER — TELEPHONE (OUTPATIENT)
Dept: ORTHOPEDICS | Facility: CLINIC | Age: 59
End: 2019-08-30

## 2019-08-30 NOTE — TELEPHONE ENCOUNTER
Case reopened for reconsideration now that the MRI has been completed. Wait for reply. Elvi Garrido LPN

## 2019-08-30 NOTE — TELEPHONE ENCOUNTER
----- Message from Nayla Mcclure MA sent at 8/30/2019  8:13 AM CDT -----  Contact: [facility], [facility]   or any one that answers    Wants to schedule peer to peer   Call back   Option 4  Case number   [phone]

## 2019-09-03 ENCOUNTER — ANESTHESIA EVENT (OUTPATIENT)
Dept: SURGERY | Facility: HOSPITAL | Age: 59
End: 2019-09-03
Payer: COMMERCIAL

## 2019-09-04 ENCOUNTER — ANESTHESIA (OUTPATIENT)
Dept: SURGERY | Facility: HOSPITAL | Age: 59
End: 2019-09-04
Payer: COMMERCIAL

## 2019-09-04 ENCOUNTER — HOSPITAL ENCOUNTER (OUTPATIENT)
Facility: HOSPITAL | Age: 59
Discharge: HOME | End: 2019-09-04
Attending: ORTHOPAEDIC SURGERY | Admitting: ORTHOPAEDIC SURGERY
Payer: COMMERCIAL

## 2019-09-04 DIAGNOSIS — S83.232A COMPLEX TEAR OF MEDIAL MENISCUS OF LEFT KNEE AS CURRENT INJURY, INITIAL ENCOUNTER: Primary | ICD-10-CM

## 2019-09-04 DIAGNOSIS — S83.242A TEAR OF MEDIAL MENISCUS OF LEFT KNEE, CURRENT, INITIAL ENCOUNTER: ICD-10-CM

## 2019-09-04 DIAGNOSIS — S83.242A TEAR OF MEDIAL MENISCUS OF LEFT KNEE, CURRENT: ICD-10-CM

## 2019-09-04 PROCEDURE — 36000710: Performed by: ORTHOPAEDIC SURGERY

## 2019-09-04 PROCEDURE — 29882 ARTHRS KNE SRG MNISC RPR M/L: CPT | Mod: LT,,, | Performed by: ORTHOPAEDIC SURGERY

## 2019-09-04 PROCEDURE — 63600175 PHARM REV CODE 636 W HCPCS: Performed by: NURSE ANESTHETIST, CERTIFIED REGISTERED

## 2019-09-04 PROCEDURE — 27201423 OPTIME MED/SURG SUP & DEVICES STERILE SUPPLY: Performed by: ORTHOPAEDIC SURGERY

## 2019-09-04 PROCEDURE — 63600175 PHARM REV CODE 636 W HCPCS: Performed by: ANESTHESIOLOGY

## 2019-09-04 PROCEDURE — D9220A PRA ANESTHESIA: ICD-10-PCS | Mod: ANES,,, | Performed by: ANESTHESIOLOGY

## 2019-09-04 PROCEDURE — 25000003 PHARM REV CODE 250: Performed by: NURSE ANESTHETIST, CERTIFIED REGISTERED

## 2019-09-04 PROCEDURE — 37000008 HC ANESTHESIA 1ST 15 MINUTES: Performed by: ORTHOPAEDIC SURGERY

## 2019-09-04 PROCEDURE — 71000033 HC RECOVERY, INTIAL HOUR: Performed by: ORTHOPAEDIC SURGERY

## 2019-09-04 PROCEDURE — 71000039 HC RECOVERY, EACH ADD'L HOUR: Performed by: ORTHOPAEDIC SURGERY

## 2019-09-04 PROCEDURE — 63600175 PHARM REV CODE 636 W HCPCS: Performed by: ORTHOPAEDIC SURGERY

## 2019-09-04 PROCEDURE — C1713 ANCHOR/SCREW BN/BN,TIS/BN: HCPCS | Performed by: ORTHOPAEDIC SURGERY

## 2019-09-04 PROCEDURE — 71000015 HC POSTOP RECOV 1ST HR: Performed by: ORTHOPAEDIC SURGERY

## 2019-09-04 PROCEDURE — 25000003 PHARM REV CODE 250: Performed by: ANESTHESIOLOGY

## 2019-09-04 PROCEDURE — D9220A PRA ANESTHESIA: Mod: ANES,,, | Performed by: ANESTHESIOLOGY

## 2019-09-04 PROCEDURE — 99900104 DSU ONLY-NO CHARGE-EA ADD'L HR (STAT): Performed by: ORTHOPAEDIC SURGERY

## 2019-09-04 PROCEDURE — D9220A PRA ANESTHESIA: ICD-10-PCS | Mod: CRNA,,, | Performed by: NURSE ANESTHETIST, CERTIFIED REGISTERED

## 2019-09-04 PROCEDURE — 99900103 DSU ONLY-NO CHARGE-INITIAL HR (STAT): Performed by: ORTHOPAEDIC SURGERY

## 2019-09-04 PROCEDURE — 25000003 PHARM REV CODE 250: Performed by: ORTHOPAEDIC SURGERY

## 2019-09-04 PROCEDURE — 29882 PR KNEE SCOPE,MED OR LAT MENIS REPAIR: ICD-10-PCS | Mod: LT,,, | Performed by: ORTHOPAEDIC SURGERY

## 2019-09-04 PROCEDURE — 37000009 HC ANESTHESIA EA ADD 15 MINS: Performed by: ORTHOPAEDIC SURGERY

## 2019-09-04 PROCEDURE — D9220A PRA ANESTHESIA: Mod: CRNA,,, | Performed by: NURSE ANESTHETIST, CERTIFIED REGISTERED

## 2019-09-04 PROCEDURE — 36000711: Performed by: ORTHOPAEDIC SURGERY

## 2019-09-04 DEVICE — ANCHOR BIOCOMP SWVLLOK: Type: IMPLANTABLE DEVICE | Site: KNEE | Status: FUNCTIONAL

## 2019-09-04 RX ORDER — LIDOCAINE HCL/PF 100 MG/5ML
SYRINGE (ML) INTRAVENOUS
Status: DISCONTINUED | OUTPATIENT
Start: 2019-09-04 | End: 2019-09-04

## 2019-09-04 RX ORDER — KETAMINE HYDROCHLORIDE 100 MG/ML
INJECTION, SOLUTION INTRAMUSCULAR; INTRAVENOUS
Status: DISCONTINUED | OUTPATIENT
Start: 2019-09-04 | End: 2019-09-04

## 2019-09-04 RX ORDER — ACETAMINOPHEN 10 MG/ML
INJECTION, SOLUTION INTRAVENOUS
Status: DISCONTINUED | OUTPATIENT
Start: 2019-09-04 | End: 2019-09-04

## 2019-09-04 RX ORDER — CEFAZOLIN SODIUM 1 G/3ML
INJECTION, POWDER, FOR SOLUTION INTRAMUSCULAR; INTRAVENOUS
Status: DISCONTINUED | OUTPATIENT
Start: 2019-09-04 | End: 2019-09-04

## 2019-09-04 RX ORDER — IBUPROFEN 800 MG/1
800 TABLET ORAL 3 TIMES DAILY
Qty: 90 TABLET | Refills: 1 | Status: SHIPPED | OUTPATIENT
Start: 2019-09-04

## 2019-09-04 RX ORDER — PROPOFOL 10 MG/ML
VIAL (ML) INTRAVENOUS
Status: DISCONTINUED | OUTPATIENT
Start: 2019-09-04 | End: 2019-09-04

## 2019-09-04 RX ORDER — FENTANYL CITRATE 50 UG/ML
INJECTION, SOLUTION INTRAMUSCULAR; INTRAVENOUS
Status: DISCONTINUED | OUTPATIENT
Start: 2019-09-04 | End: 2019-09-04

## 2019-09-04 RX ORDER — METOCLOPRAMIDE HYDROCHLORIDE 5 MG/ML
10 INJECTION INTRAMUSCULAR; INTRAVENOUS EVERY 10 MIN PRN
Status: DISCONTINUED | OUTPATIENT
Start: 2019-09-04 | End: 2019-09-04 | Stop reason: HOSPADM

## 2019-09-04 RX ORDER — ONDANSETRON 2 MG/ML
4 INJECTION INTRAMUSCULAR; INTRAVENOUS DAILY PRN
Status: DISCONTINUED | OUTPATIENT
Start: 2019-09-04 | End: 2019-09-04 | Stop reason: HOSPADM

## 2019-09-04 RX ORDER — FENTANYL CITRATE 50 UG/ML
25 INJECTION, SOLUTION INTRAMUSCULAR; INTRAVENOUS EVERY 5 MIN PRN
Status: COMPLETED | OUTPATIENT
Start: 2019-09-04 | End: 2019-09-04

## 2019-09-04 RX ORDER — OXYCODONE HYDROCHLORIDE 5 MG/1
5 TABLET ORAL
Status: DISCONTINUED | OUTPATIENT
Start: 2019-09-04 | End: 2019-09-04 | Stop reason: HOSPADM

## 2019-09-04 RX ORDER — SODIUM CHLORIDE, SODIUM LACTATE, POTASSIUM CHLORIDE, CALCIUM CHLORIDE 600; 310; 30; 20 MG/100ML; MG/100ML; MG/100ML; MG/100ML
INJECTION, SOLUTION INTRAVENOUS CONTINUOUS
Status: DISCONTINUED | OUTPATIENT
Start: 2019-09-04 | End: 2019-09-04 | Stop reason: HOSPADM

## 2019-09-04 RX ORDER — LIDOCAINE HYDROCHLORIDE 10 MG/ML
1 INJECTION, SOLUTION EPIDURAL; INFILTRATION; INTRACAUDAL; PERINEURAL ONCE
Status: DISCONTINUED | OUTPATIENT
Start: 2019-09-04 | End: 2019-09-04 | Stop reason: HOSPADM

## 2019-09-04 RX ORDER — MIDAZOLAM HYDROCHLORIDE 1 MG/ML
INJECTION INTRAMUSCULAR; INTRAVENOUS
Status: DISCONTINUED | OUTPATIENT
Start: 2019-09-04 | End: 2019-09-04

## 2019-09-04 RX ORDER — DEXAMETHASONE SODIUM PHOSPHATE 4 MG/ML
INJECTION, SOLUTION INTRA-ARTICULAR; INTRALESIONAL; INTRAMUSCULAR; INTRAVENOUS; SOFT TISSUE
Status: DISCONTINUED | OUTPATIENT
Start: 2019-09-04 | End: 2019-09-04

## 2019-09-04 RX ORDER — GLYCOPYRROLATE 0.2 MG/ML
INJECTION INTRAMUSCULAR; INTRAVENOUS
Status: DISCONTINUED | OUTPATIENT
Start: 2019-09-04 | End: 2019-09-04

## 2019-09-04 RX ORDER — CEFAZOLIN SODIUM 2 G/50ML
2 SOLUTION INTRAVENOUS
Status: DISCONTINUED | OUTPATIENT
Start: 2019-09-04 | End: 2019-09-04 | Stop reason: HOSPADM

## 2019-09-04 RX ORDER — PHENYLEPHRINE HYDROCHLORIDE 10 MG/ML
INJECTION INTRAVENOUS
Status: DISCONTINUED | OUTPATIENT
Start: 2019-09-04 | End: 2019-09-04

## 2019-09-04 RX ORDER — EPINEPHRINE 1 MG/ML
INJECTION, SOLUTION INTRACARDIAC; INTRAMUSCULAR; INTRAVENOUS; SUBCUTANEOUS
Status: DISCONTINUED | OUTPATIENT
Start: 2019-09-04 | End: 2019-09-04 | Stop reason: HOSPADM

## 2019-09-04 RX ORDER — KETOROLAC TROMETHAMINE 30 MG/ML
INJECTION, SOLUTION INTRAMUSCULAR; INTRAVENOUS
Status: DISCONTINUED | OUTPATIENT
Start: 2019-09-04 | End: 2019-09-04

## 2019-09-04 RX ORDER — HYDROCODONE BITARTRATE AND ACETAMINOPHEN 7.5; 325 MG/1; MG/1
1 TABLET ORAL EVERY 6 HOURS PRN
Qty: 28 TABLET | Refills: 0 | Status: SHIPPED | OUTPATIENT
Start: 2019-09-04 | End: 2019-09-11

## 2019-09-04 RX ORDER — ONDANSETRON HYDROCHLORIDE 2 MG/ML
INJECTION, SOLUTION INTRAMUSCULAR; INTRAVENOUS
Status: DISCONTINUED | OUTPATIENT
Start: 2019-09-04 | End: 2019-09-04

## 2019-09-04 RX ORDER — MUPIROCIN 20 MG/G
OINTMENT TOPICAL
Status: DISCONTINUED | OUTPATIENT
Start: 2019-09-04 | End: 2019-09-04 | Stop reason: HOSPADM

## 2019-09-04 RX ADMIN — KETAMINE HYDROCHLORIDE 25 MG: 100 INJECTION, SOLUTION, CONCENTRATE INTRAMUSCULAR; INTRAVENOUS at 08:09

## 2019-09-04 RX ADMIN — ONDANSETRON 4 MG: 2 INJECTION, SOLUTION INTRAMUSCULAR; INTRAVENOUS at 08:09

## 2019-09-04 RX ADMIN — OXYCODONE HYDROCHLORIDE 5 MG: 5 TABLET ORAL at 10:09

## 2019-09-04 RX ADMIN — FENTANYL CITRATE 25 MCG: 0.05 INJECTION, SOLUTION INTRAMUSCULAR; INTRAVENOUS at 10:09

## 2019-09-04 RX ADMIN — PROPOFOL 100 MG: 10 INJECTION, EMULSION INTRAVENOUS at 08:09

## 2019-09-04 RX ADMIN — FENTANYL CITRATE 50 MCG: 50 INJECTION, SOLUTION INTRAMUSCULAR; INTRAVENOUS at 09:09

## 2019-09-04 RX ADMIN — PHENYLEPHRINE HYDROCHLORIDE 100 MCG: 10 INJECTION INTRAVENOUS at 08:09

## 2019-09-04 RX ADMIN — GLYCOPYRROLATE 0.2 MG: 0.2 INJECTION, SOLUTION INTRAMUSCULAR; INTRAVENOUS at 08:09

## 2019-09-04 RX ADMIN — MIDAZOLAM HYDROCHLORIDE 2 MG: 1 INJECTION, SOLUTION INTRAMUSCULAR; INTRAVENOUS at 07:09

## 2019-09-04 RX ADMIN — CEFAZOLIN 2 G: 1 INJECTION, POWDER, FOR SOLUTION INTRAVENOUS at 08:09

## 2019-09-04 RX ADMIN — ACETAMINOPHEN 1000 MG: 10 INJECTION, SOLUTION INTRAVENOUS at 08:09

## 2019-09-04 RX ADMIN — MUPIROCIN: 20 OINTMENT TOPICAL at 06:09

## 2019-09-04 RX ADMIN — LIDOCAINE HYDROCHLORIDE 50 MG: 20 INJECTION, SOLUTION INTRAVENOUS at 08:09

## 2019-09-04 RX ADMIN — KETOROLAC TROMETHAMINE 30 MG: 30 INJECTION, SOLUTION INTRAMUSCULAR; INTRAVENOUS at 09:09

## 2019-09-04 RX ADMIN — SODIUM CHLORIDE, SODIUM LACTATE, POTASSIUM CHLORIDE, AND CALCIUM CHLORIDE: .6; .31; .03; .02 INJECTION, SOLUTION INTRAVENOUS at 06:09

## 2019-09-04 RX ADMIN — FENTANYL CITRATE 50 MCG: 50 INJECTION, SOLUTION INTRAMUSCULAR; INTRAVENOUS at 08:09

## 2019-09-04 RX ADMIN — DEXAMETHASONE SODIUM PHOSPHATE 4 MG: 4 INJECTION, SOLUTION INTRAMUSCULAR; INTRAVENOUS at 08:09

## 2019-09-04 RX ADMIN — OXYCODONE HYDROCHLORIDE 5 MG: 5 TABLET ORAL at 09:09

## 2019-09-04 NOTE — OP NOTE
[facility]  Orthopedic Surgery Department  Operative Note    SUMMARY     Date of Procedure: 9/4/2019     Procedure: Procedure(s) (LRB):  ARTHROSCOPY, KNEE (Left)  REPAIR, MENISCUS, KNEE (Left)     Surgeon(s) and Role:     * Nahum Espinosa II, MD - Primary    Assisting Surgeon: None    First Assist:  YUSRA Vasquez    Pre-Operative Diagnosis: Tear of medial meniscus of left knee, current, initial encounter [S83.242A]    Post-Operative Diagnosis: Post-Op Diagnosis Codes:     * Tear of medial meniscus of left knee, current, initial encounter [S83.242A]    Anesthesia: General    Technical Procedures Used:  Left knee arthroscopy with meniscal root repair of the medial meniscus    Description of the Findings of the Procedure:  Dictated    Significant Surgical Tasks Conducted by the Assistant(s), if Applicable:  With portal closure and bandage application    Complications: No    Estimated Blood Loss (EBL): * No values recorded between 9/4/2019  8:30 AM and 9/4/2019  9:21 AM *           Implants:   Implant Name Type Inv. Item Serial No.  Lot No. LRB No. Used   [employer]OR [employer] SWVLLOK - [id number]  [employer] SWVLLOK  [employer] [id number] 1       Specimens:   Specimen (12h ago, onward)    None                  Condition: Good    Disposition: PACU - hemodynamically stable.    Attestation: I was present and scrubbed for the entire procedure.    Procedure In Detail:  The patient was brought to the operating room and placed on the table in the supine position.  The patient underwent general endotracheal intubation without complication.  A tourniquet was placed on the left lower extremity and was placed in arthroscopic leg singh and prepped and draped in the normal sterile fashion.  An Esmarch was used exsanguinate the limb and the tourniquet was taken up to 250 mm of mercury.  A standard lateral parapatellar arthroscopy portal was created and the scope was introduced into the knee.   The suprapatellar pouch was inspected and noted be free of loose bodies and disease.  The medial gutter was inspected and noted be free of loose bodies.  The medial compartment was entered and under direct arthroscopic visualization the medial portal was created.  The meniscus was identified and probed.  There was some fraying on the inner 3rd which was debrided.  We continued to probe posteriorly and there was noted to be a tear in the root of the medial meniscus of the left knee. We then proceeded to complete the rest of the diagnostic portion of the arthroscopy.  The intercondylar notch was inspected, ACL and PCL were identified and probed and noted be free of tears.  There are patellofemoral articulation was inspected and noted be normal.  The lateral compartment was entered and meniscus and cartilage in the lateral compartment was noted be normal.  The lateral gutter was inspected and noted to be free of loose bodies.    Attention was then turned back to medial compartment.  The root of the medial meniscus was noted to be hypermobile.  There of the root tear was gently debrided and then we used day meniscal scorpion from [employer] to pass a FiberWire suture through the torn root.  We did not have a meniscal root guide available so we freehanded a pin from the medial tibial cortex towards the posterior horn.  The guide pin pierced right at the insertion of the root.  We then over drilled the pin with a 4 mm Reamer.  We then passed a FiberWire through a straw up the tunnel we drilled. We then grafts that with a lobster car grasper pulling the FiberWire suture out through the medial portal. We then placed the tails of the FiberWire we passed through the root through the loop in the new FiberWire and pulled back down through the tunnel and out the tibia distally. This pulled the torn root back down onto the tibia and into the socket we created.  We then affixed the into the suture to the tibia with a 4.5 mm SwiveLock  suture. After completing the fixation meniscus was probed in the root tear was noted to now be stable. The scope was then removed from the knee and the portal sites were closed with 3 O nylon along with a small stab incision where we inserted the SwiveLock and created the tunnel.  A sterile intraoperative dressing was placed along with a polar Care device for postoperative pain control and a knee immobilizer. The patient was then awakened from anesthesia and taken recovery where she was noted be stable postoperatively.  Needle and lap counts were correct at the end the case.

## 2019-09-04 NOTE — INTERVAL H&P NOTE
The patient has been examined and the H&P has been reviewed:    I concur with the findings and changes have been noted since the H&P was written: MRI showed tearing of the medial meniscus of the left knee    Anesthesia/Surgery risks, benefits and alternative options discussed and understood by patient/family.          Active Hospital Problems    Diagnosis  POA    Tear of medial meniscus of left knee, current [S83.242A]  Yes      Resolved Hospital Problems   No resolved problems to display.

## 2019-09-04 NOTE — ANESTHESIA PREPROCEDURE EVALUATION
09/04/2019  Akua Anderson is a 59 y.o., female.    Anesthesia Evaluation    I have reviewed the Patient Summary Reports.    I have reviewed the Nursing Notes.   I have reviewed the Medications.     Review of Systems  Anesthesia Hx:  Denies Family Hx of Anesthesia complications.   Denies Personal Hx of Anesthesia complications.   Endocrine:   Hypothyroidism        Physical Exam  General:  Obesity    Airway/Jaw/Neck:  Airway Findings: Mouth Opening: Normal Tongue: Normal  General Airway Assessment: Adult  Mallampati: II  TM Distance: Normal, at least 6 cm  Jaw/Neck Findings:  Neck ROM: Normal ROM  Neck Findings:      Dental:  Dental Findings: In tact   Chest/Lungs:  Chest/Lungs Clear    Heart/Vascular:  Heart Findings: Normal            Anesthesia Plan  Type of Anesthesia, risks & benefits discussed:  Anesthesia Type:  general  Patient's Preference:   Intra-op Monitoring Plan: standard ASA monitors  Intra-op Monitoring Plan Comments:   Post Op Pain Control Plan:   Post Op Pain Control Plan Comments:   Induction:   IV  Beta Blocker:  Patient is not currently on a Beta-Blocker (No further documentation required).       Informed Consent: Patient understands risks and agrees with Anesthesia plan.  Questions answered. Anesthesia consent signed with patient.  ASA Score: 2     Day of Surgery Review of History & Physical:    H&P update referred to the surgeon.         Ready For Surgery From Anesthesia Perspective.

## 2019-09-04 NOTE — TRANSFER OF CARE
"Anesthesia Transfer of Care Note    Patient: Akua Anderson    Procedure(s) Performed: Procedure(s) (LRB):  ARTHROSCOPY, KNEE (Left)  REPAIR, MENISCUS, KNEE (Left)    Patient location: PACU    Anesthesia Type: general    Transport from OR: Transported from OR on 2-3 L/min O2 by NC with adequate spontaneous ventilation    Post pain: adequate analgesia    Post assessment: no apparent anesthetic complications and tolerated procedure well    Post vital signs: stable    Level of consciousness: sedated and responds to stimulation    Nausea/Vomiting: no nausea/vomiting    Complications: none    Transfer of care protocol was followed      Last vitals:   Visit Vitals  /72   Pulse 61   Temp 36.8 °C (98.3 °F) (Oral)   Resp 14   Ht 5' 1" (1.549 m)   Wt 72.6 kg (160 lb)   SpO2 98%   Breastfeeding? No   BMI 30.23 kg/m²     "

## 2019-09-04 NOTE — DISCHARGE SUMMARY
[facility]  Department of Orthopedic Surgery  Discharge Note  Short Stay    Admit Date: 9/4/2019    Discharge Date and Time: No discharge date for patient encounter.     Attending Physician: Nahum Espinosa II, MD     Discharge Provider: Nahum Espinosa II    Diagnoses:  Active Hospital Problems    Diagnosis  POA    Tear of medial meniscus of left knee, current [S83.242A]  Yes      Resolved Hospital Problems   No resolved problems to display.       Discharged Condition: good    Hospital Course: Patient was admitted for an outpatient procedure and tolerated the procedure well with no complications.    Final Diagnoses: Same as principal problem.    Disposition: Home or Self Care    Follow up/Patient Instructions:    Medications:  Reconciled Home Medications:      Medication List      CONTINUE taking these medications    levothyroxine 112 MCG tablet  Commonly known as:  SYNTHROID  Take 1 tablet (112 mcg total) by mouth once daily.     loratadine 10 mg tablet  Commonly known as:  CLARITIN  Take 10 mg by mouth daily as needed.        ASK your doctor about these medications    HYDROcodone-acetaminophen 7.5-325 mg per tablet  Commonly known as:  NORCO  Take 1 tablet by mouth every 6 (six) hours as needed.     ibuprofen 800 MG tablet  Commonly known as:  ADVIL,MOTRIN  Take 1 tablet (800 mg total) by mouth 3 (three) times daily.          Discharge Procedure Orders   Diet Adult Regular     Ice to affected area   Order Comments: Applied polar Care to left knee     Notify your health care provider if you experience any of the following:  temperature >100.4     Notify your health care provider if you experience any of the following:  persistent nausea and vomiting or diarrhea     Notify your health care provider if you experience any of the following:  severe uncontrolled pain     Notify your health care provider if you experience any of the following:  redness, tenderness, or signs of infection (pain, swelling,  redness, odor or green/yellow discharge around incision site)     Notify your health care provider if you experience any of the following:  difficulty breathing or increased cough     Notify your health care provider if you experience any of the following:  severe persistent headache     Notify your health care provider if you experience any of the following:  worsening rash     Notify your health care provider if you experience any of the following:  persistent dizziness, light-headedness, or visual disturbances     Notify your health care provider if you experience any of the following:  increased confusion or weakness     Notify your health care provider if you experience any of the following:     Change dressing (specify)   Order Comments: Dressing change: One time per day beginning 72 hours post op.     Follow-up Information     Nahum WELLINGTON Espinosa II, MD In 1 week.    Specialty:  Orthopedic Surgery  Contact information:  [address] [place] [address] DR Ameya MCKENZIE [id number] [phone]                   Discharge Procedure Orders (must include Diet, Follow-up, Activity):   Discharge Procedure Orders (must include Diet, Follow-up, Activity)   Diet Adult Regular     Ice to affected area   Order Comments: Applied polar Care to left knee     Notify your health care provider if you experience any of the following:  temperature >100.4     Notify your health care provider if you experience any of the following:  persistent nausea and vomiting or diarrhea     Notify your health care provider if you experience any of the following:  severe uncontrolled pain     Notify your health care provider if you experience any of the following:  redness, tenderness, or signs of infection (pain, swelling, redness, odor or green/yellow discharge around incision site)     Notify your health care provider if you experience any of the following:  difficulty breathing or increased cough     Notify your health care provider if you experience any of the  following:  severe persistent headache     Notify your health care provider if you experience any of the following:  worsening rash     Notify your health care provider if you experience any of the following:  persistent dizziness, light-headedness, or visual disturbances     Notify your health care provider if you experience any of the following:  increased confusion or weakness     Notify your health care provider if you experience any of the following:     Change dressing (specify)   Order Comments: Dressing change: One time per day beginning 72 hours post op.

## 2019-09-04 NOTE — PLAN OF CARE
Discharge instructions given to pt, verbalized understanding. IV removed. Discharge via wheelchair.

## 2019-09-04 NOTE — PLAN OF CARE
Pt transferred to phase II. VSS, pain tolerable/improved after pain meds given, dressing to L knee cdi, immobilizer on to L knee, cryo on to L knee, tolerated clear liquids without N/V. Report given to BIANCA Azar.

## 2019-09-04 NOTE — PLAN OF CARE
Pt ambulated to pre-op area without incident. Daughter present. Pt oriented to pre procedure process, questions answered. Pt verbalized understanding.

## 2019-09-04 NOTE — DISCHARGE INSTRUCTIONS
General Information:    1.  Do not drink alcoholic beverages including beer for 24 hours or as long as you are on pain medication..  2.  Do not drive a motor vehicle, operate machinery or power tools, or signs legal papers for 24 hours or as long as you are on pain medication.   3.  You may experience light-headedness, dizziness, and sleepiness following surgery. Please do not stay alone. A responsible adult should be with you for this 24 hour period.  4.  Go home and rest.    5. Progress slowly to a normal diet unless instructed.  Otherwise, begin with liquids such as soft drinks, then soup and crackers working up to solid foods. Drink plenty of nonalcoholic fluids.  6.  Certain anesthetics and pain medications produce nausea and vomiting in certain       individuals. If nausea becomes a problem at home, call you doctor.    7. A nurse will be calling you sometime after surgery. Do not be alarmed. This is our way of finding out how you are doing.    8. Several times every hour while you are awake, take 2-3 deep breaths and cough. If you had stomach surgery hold a pillow or rolled towel firmly against your stomach before you cough. This will help with any pain the cough might cause.  9. Several times every hour while you are awake, pump and flex your feet 5-6 times and do foot circles. This will help prevent blood clots.    10.Call your doctor for severe pain, bleeding, fever, or signs or symptoms of infection (pain, swelling, redness, foul odor, drainage).    Discharge Instructions: After Your Surgery/Procedure  Youve just had surgery. During surgery you were given medicine called anesthesia to keep you relaxed and free of pain. After surgery you may have some pain or nausea. This is common. Here are some tips for feeling better and getting well after surgery.     Stay on schedule with your medication.   Going home  Your doctor or nurse will show you how to take care of yourself when you go home. He or she will  "also answer your questions. Have an adult family member or friend drive you home.      For your safety we recommend these precaution for the first 24 hours after your procedure:  · Do not drive or use heavy equipment.  · Do not make important decisions or sign legal papers.  · Do not drink alcohol.  · Have someone stay with you, if needed. He or she can watch for problems and help keep you safe.  · Your concentration, balance, coordination, and judgement may be impaired for many hours after anesthesia.  Use caution when ambulating or standing up.     · You may feel weak and "washed out" after anesthesia and surgery.      Subtle residual effects of general anesthesia or sedation with regional / local anesthesia can last more than 24 hours.  Rest for the remainder of the day or longer if your Doctor/Surgeon has advised you to do so.  Although you may feel normal within the first 24 hours, your reflexes and mental ability may be impaired without you realizing it.  You may feel dizzy, lightheaded or sleepy for 24 hours or longer.      Be sure to go to all follow-up visits with your doctor. And rest after your surgery for as long as your doctor tells you to.  Coping with pain  If you have pain after surgery, pain medicine will help you feel better. Take it as told, before pain becomes severe. Also, ask your doctor or pharmacist about other ways to control pain. This might be with heat, ice, or relaxation. And follow any other instructions your surgeon or nurse gives you.  Tips for taking pain medicine  To get the best relief possible, remember these points:  · Pain medicines can upset your stomach. Taking them with a little food may help.  · Most pain relievers taken by mouth need at least 20 to 30 minutes to start to work.  · Taking medicine on a schedule can help you remember to take it. Try to time your medicine so that you can take it before starting an activity. This might be before you get dressed, go for a walk, " or sit down for dinner.  · Constipation is a common side effect of pain medicines. Call your doctor before taking any medicines such as laxatives or stool softeners to help ease constipation. Also ask if you should skip any foods. Drinking lots of fluids and eating foods such as fruits and vegetables that are high in fiber can also help. Remember, do not take laxatives unless your surgeon has prescribed them.  · Drinking alcohol and taking pain medicine can cause dizziness and slow your breathing. It can even be deadly. Do not drink alcohol while taking pain medicine.  · Pain medicine can make you react more slowly to things. Do not drive or run machinery while taking pain medicine.  Your health care provider may tell you to take acetaminophen to help ease your pain. Ask him or her how much you are supposed to take each day. Acetaminophen or other pain relievers may interact with your prescription medicines or other over-the-counter (OTC) drugs. Some prescription medicines have acetaminophen and other ingredients. Using both prescription and OTC acetaminophen for pain can cause you to overdose. Read the labels on your OTC medicines with care. This will help you to clearly know the list of ingredients, how much to take, and any warnings. It may also help you not take too much acetaminophen. If you have questions or do not understand the information, ask your pharmacist or health care provider to explain it to you before you take the OTC medicine.  Managing nausea  Some people have an upset stomach after surgery. This is often because of anesthesia, pain, or pain medicine, or the stress of surgery. These tips will help you handle nausea and eat healthy foods as you get better. If you were on a special food plan before surgery, ask your doctor if you should follow it while you get better. These tips may help:  · Do not push yourself to eat. Your body will tell you when to eat and how much.  · Start off with clear  liquids and soup. They are easier to digest.  · Next try semi-solid foods, such as mashed potatoes, applesauce, and gelatin, as you feel ready.  · Slowly move to solid foods. Dont eat fatty, rich, or spicy foods at first.  · Do not force yourself to have 3 large meals a day. Instead eat smaller amounts more often.  · Take pain medicines with a small amount of solid food, such as crackers or toast, to avoid nausea.     Call your surgeon if  · You still have pain an hour after taking medicine. The medicine may not be strong enough.  · You feel too sleepy, dizzy, or groggy. The medicine may be too strong.  · You have side effects like nausea, vomiting, or skin changes, such as rash, itching, or hives.       If you have obstructive sleep apnea  You were given anesthesia medicine during surgery to keep you comfortable and free of pain. After surgery, you may have more apnea spells because of this medicine and other medicines you were given. The spells may last longer than usual.   At home:  · Keep using the continuous positive airway pressure (CPAP) device when you sleep. Unless your health care provider tells you not to, use it when you sleep, day or night. CPAP is a common device used to treat obstructive sleep apnea.  · Talk with your provider before taking any pain medicine, muscle relaxants, or sedatives. Your provider will tell you about the possible dangers of taking these medicines.  © 9004-2619 [employer]. [address] [place] [address]. All rights reserved. This information is not intended as a substitute for professional medical care. Always follow your healthcare professional's instructions.    Post op instructions for prevention of DVT  What is deep vein thrombosis?  Deep vein thrombosis (DVT) is the medical term for blood clots in the deep veins of the leg.  These blood clots can be dangerous.  A DVT can block a blood vessel and keep blood from getting where it needs to go.   Another problem is that the clot can travel to other parts of the body such as the lungs.  A clot that travels to the lungs is called a pulmonary embolus (PE) and can cause serious problems with breathing which can lead to death.  Am I at risk for DVT/PE?  If you are not very active, you are at risk of DVT.  Anyone confined to bed, sitting for long periods of time, recovering from surgery, etc. increases the risk of DVT.  Other risk factors are cancer diagnosis, certain medications, estrogen replacement in any form,older age, obesity, pregnancy, smoking, history of clotting disorders, and dehydration.  How will I know if I have a DVT?   Swelling in the lower leg   Pain   Warmth, redness, hardness or bulging of the vein  If you have any of these symptoms, call your doctors office right away.  Some people will not have any symptoms until the clot moves to the lungs.  What are the symptoms of a PE?   Panting, shortness of breath, or trouble breathing   Sharp, knife-like chest pain when you breathe   Coughing or coughing up blood   Rapid heartbeat  If you have any of these symptoms or get worse quickly, call 911 for emergency treatment.  How can I prevent a DVT?   Avoid long periods of inactivity and dont cross your legs--get up and walk around every hour or so.   Stay active--walking after surgery is highly encouraged.  This means you should get out of the house and walk in the neighborhood.  Going up and down stairs will not impair healing (unless advised against such activity by your doctor).     Drink plenty of noncaffeinated, nonalcoholic fluids each day to prevent dehydration.   Wear special support stockings, if they have been advised by your doctor.   If you travel, stop at least once an hour and walk around.   Avoid smoking (assistance with stopping is available through your healthcare provider)  Always notify your doctor if you are not able to follow the post operative instructions that are  given to you at the time of discharge.  It may be necessary to prescribe one of the medications available to prevent DVT.    We hope your stay was comfortable as you heal now, mend and rest.    For we have enjoyed taking care of you by giving your our best.    And as you get better, by regaining your health and strength;   We count it as a privilege to have served you and hope your time at [facility] was well spent.      Thank  You!!!

## 2019-09-05 VITALS
OXYGEN SATURATION: 100 % | BODY MASS INDEX: 30.21 KG/M2 | WEIGHT: 160 LBS | HEIGHT: 61 IN | SYSTOLIC BLOOD PRESSURE: 127 MMHG | DIASTOLIC BLOOD PRESSURE: 64 MMHG | TEMPERATURE: 98 F | HEART RATE: 59 BPM | RESPIRATION RATE: 14 BRPM

## 2019-09-10 ENCOUNTER — OFFICE VISIT (OUTPATIENT)
Dept: ORTHOPEDICS | Facility: CLINIC | Age: 59
End: 2019-09-10
Payer: COMMERCIAL

## 2019-09-10 VITALS
HEART RATE: 62 BPM | BODY MASS INDEX: 30.21 KG/M2 | DIASTOLIC BLOOD PRESSURE: 60 MMHG | WEIGHT: 160 LBS | HEIGHT: 61 IN | SYSTOLIC BLOOD PRESSURE: 109 MMHG

## 2019-09-10 DIAGNOSIS — S83.232A COMPLEX TEAR OF MEDIAL MENISCUS OF LEFT KNEE AS CURRENT INJURY, INITIAL ENCOUNTER: Primary | ICD-10-CM

## 2019-09-10 DIAGNOSIS — S83.232D COMPLEX TEAR OF MEDIAL MENISCUS OF LEFT KNEE AS CURRENT INJURY, SUBSEQUENT ENCOUNTER: Primary | ICD-10-CM

## 2019-09-10 PROCEDURE — 99999 PR PBB SHADOW E&M-EST. PATIENT-LVL III: ICD-10-PCS | Mod: PBBFAC,,, | Performed by: ORTHOPAEDIC SURGERY

## 2019-09-10 PROCEDURE — 99024 POSTOP FOLLOW-UP VISIT: CPT | Mod: S$GLB,,, | Performed by: ORTHOPAEDIC SURGERY

## 2019-09-10 PROCEDURE — 99024 SUTURE REMOVAL: ICD-10-PCS | Mod: S$GLB,,, | Performed by: ORTHOPAEDIC SURGERY

## 2019-09-10 PROCEDURE — 99999 PR PBB SHADOW E&M-EST. PATIENT-LVL III: CPT | Mod: PBBFAC,,, | Performed by: ORTHOPAEDIC SURGERY

## 2019-09-10 PROCEDURE — 99024 PR POST-OP FOLLOW-UP VISIT: ICD-10-PCS | Mod: S$GLB,,, | Performed by: ORTHOPAEDIC SURGERY

## 2019-09-10 NOTE — PROGRESS NOTES
CC:  59-year-old female status post left knee arthroscopy with repair of a tear of the medial meniscal root.    LLE:  Portal sites are clean, dry, intact with no signs of infection.  Grossly intact motor and sensory function distally. Plus two distal pulses.    Dx:  Status post left knee arthroscopy with medial meniscal root repair, stable    Plan:  Sutures removed and Steri-Strips applied. I went over the arthroscopy pictures with the patient and her daughter.  We will start her in physical therapy.  The 1st week will be range of motion only and then we will begin progressive weight-bearing to protect the meniscal repair. Follow-up in 1 month.

## 2019-09-12 ENCOUNTER — CLINICAL SUPPORT (OUTPATIENT)
Dept: REHABILITATION | Facility: HOSPITAL | Age: 59
End: 2019-09-12
Attending: ORTHOPAEDIC SURGERY
Payer: COMMERCIAL

## 2019-09-12 DIAGNOSIS — R26.9 GAIT ABNORMALITY: ICD-10-CM

## 2019-09-12 DIAGNOSIS — S83.232A COMPLEX TEAR OF MEDIAL MENISCUS OF LEFT KNEE AS CURRENT INJURY, INITIAL ENCOUNTER: ICD-10-CM

## 2019-09-12 PROCEDURE — 97161 PT EVAL LOW COMPLEX 20 MIN: CPT | Mod: PN | Performed by: PHYSICAL THERAPIST

## 2019-09-12 PROCEDURE — 97110 THERAPEUTIC EXERCISES: CPT | Mod: PN | Performed by: PHYSICAL THERAPIST

## 2019-09-12 NOTE — PATIENT INSTRUCTIONS
Stretching: Calf - Towel        Sit with knee straight and towel looped around left foot. Gently pull on towel until stretch is felt in calf. Hold __30__ seconds.  Repeat __3__ times per set. Do __1__ sets per session. Do __1__ sessions per day.     [email or web address]     Copyright © [employer]. All rights reserved.   Stretching: Hamstring (Sitting)        With right leg straight, tuck other foot near groin. Reach down until stretch is felt in back of thigh. Keep back straight. Hold __30__ seconds.  Repeat __3__ times per set. Do __1__ sets per session. Do __1__ sessions per day.     [email or web address]     Copyright © [employer]. All rights reserved.   Quad Set: Slight Flexion        Tense muscles on top of left thigh. Hold __3__ seconds.  Repeat __10__ times per set. Do __3__ sets per session. Do ____ sessions per day.     [email or web address]     Copyright © [employer]. All rights reserved.   PROM: Knee Flexion        With towel around left heel, gently pull knee up with towel until stretch is felt. Hold __3__ seconds.  Repeat __10__ times per set. Do __3__ sets per session. Do __1__ sessions per day.     [email or web address]     Copyright © [employer]. All rights reserved.   Strengthening: Hip Adduction - Isometric        With ball or folded pillow between knees, squeeze knees together. Hold __3__ seconds.  Repeat __10__ times per set. Do __3__ sets per session. Do __1__ sessions per day.     [email or web address]     Copyright © [employer]. All rights reserved.   Strengthening: Hip Abductor - Resisted        With band looped around both legs above knees, push thighs apart.  Repeat __10__ times per set. Do __3__ sets per session. Do __1__ sessions per day.     [email or web address]     Copyright © [employer]. All rights reserved.   Strengthening: Straight Leg Raise (Phase 1)        Tighten muscles on front of right thigh, then lift leg __4__ inches from surface, keeping knee locked.   Repeat __10__ times per set. Do __3__ sets  per session. Do __1__ sessions per day.     [email or web address]     Copyright © [employer]. All rights reserved.

## 2019-09-12 NOTE — PLAN OF CARE
"[facility]  Physical Therapy Initial Evaluation    Name: Akua Anderson  Clinic Number: [id number]    Therapy Diagnosis:   Encounter Diagnoses   Name Primary?    Complex tear of medial meniscus of left knee as current injury, initial encounter     Gait abnormality      Physician: Nahum Espinosa II, MD    Physician Orders: PT Eval and Treat as per protocol  Medical Diagnosis from Referral: Complex tear of medial meniscus of left knee as current injury, initial encounter  Evaluation Date: 2019  Authorization Period Expiration: 2019  Plan of Care Expiration: 2019  Visit # / Visits authorized:     Time In: 1403  Time Out: 1500  Total Billable Time: 57 minutes    Precautions: Standard and Weightbearing (TDWB)    Subjective   Date of onset: DOS: 2019  History of current condition - Akua reports: she fell on her left knee approximately 3 months ago. She stated she went out the country on a trip and did a lot of walking. She noted a significant increase in left knee pain. She underwent a medial meniscal root repair on 2019.     Medical History:   Past Medical History:   Diagnosis Date    Clavicle fracture     Hypothyroidism        Surgical History:   Akua Anderson  has a past surgical history that includes  section; Cervical spine surgery; Dilation and curettage of uterus; Tonsillectomy; Arthroscopy of knee (Left, 2019); and Repair of meniscus of knee (Left, 2019).    Medications:   Akua has a current medication list which includes the following prescription(s): ibuprofen, levothyroxine, and loratadine.    Allergies:   Review of patient's allergies indicates:  No Known Allergies     Imaging, MRI studies: "1. Complex tear involving the medial meniscus including a full-thickness radial component at the posterior horn root junction. 2. Focal grade 3 chondrosis medial patellar facet. 3. Small Baker " cyst.    Prior Therapy: None  Social History: Single lives with their daughter  Occupation:   Prior Level of Function: Independent  Current Level of Function: Decreased ability to perform ADL and limitation in tolerance to ambulation.    Pain:  Current 0/10, worst 3/10, best 0/10   Location: left knee   Description: Aching  Aggravating Factors: Standing  Easing Factors: ice    Pts goals: Return to PLOF    Objective     Posture: Decreased lumbar lordosis and forward head in standing  Palpation: Mild point tenderness noted with palpation of the left knee  Sensation: Intact  Range of Motion/Strength:     Knee ROM Left Right Pain/Dysfunction with Movement   Knee flexion 62 degrees 134 degress    Knee extension 0 degrees 0 degrees      Knee MMT Left Right   Knee flexion Not tested 5/5   Knee extension Not tested  Quad tone Fair (-) 5/5       Girth measurements Left Right   5 cm above MJL  44.4 cm  43.5 cm    At MJL  42.3 cm  40.1 cm    5 cm below MJL 39.4 cm  37.0 cm        Gait With AD.  Device Used -  Axillary crutches   Analysis Patient ambulating NWB Left LE       Other:       CMS Impairment/Limitation/Restriction for FOTO  Survey    Therapist reviewed FOTO scores for Akua Anderson on 9/12/2019.   FOTO documents entered into [employer] - see Media section.    Limitation Score: 76%  Category: Mobility    Current : CL = least 60% but < 80% impaired, limited or restricted  Goal: CK = at least 40% but < 60% impaired, limited or restricted           TREATMENT   Treatment Time In: 1430  Treatment Time Out: 1500  Total Treatment time separate from Evaluation: 30 minutes    Akua received therapeutic exercises to develop strength, ROM and flexibility for 30 minutes including:    Long sitting HSS 3 x 30 sec  Long sitting GSS 3 x 30 sec  Quad sets 3 x 10  SLR 3 x 10  Adductor squeeze 3 x 10  Hook lying abduction 3 x 10 RTB      Home Exercises and Patient Education Provided    Education provided:   -  Instructed patient on WB and ROM precautions of procedure    Written Home Exercises Provided: yes.  Exercises were reviewed and Akua was able to demonstrate them prior to the end of the session.  Akua demonstrated good  understanding of the education provided.     See EMR under Patient Instructions for exercises provided 9/12/2019.    Assessment   Akua is a 59 y.o. female referred to outpatient Physical Therapy with a medical diagnosis of Complex tear of medial meniscus of left knee as current injury, initial encounter. Pt presents with     1. Left knee pain  2. Decreased knee ROM   3. Decreased knee strength  4. Decreased ambulatory status    Pt prognosis is Good.   Pt will benefit from skilled outpatient Physical Therapy to address the deficits stated above and in the chart below, provide pt/family education, and to maximize pt's level of independence.     Plan of care discussed with patient: Yes  Pt's spiritual, cultural and educational needs considered and patient is agreeable to the plan of care and goals as stated below:     Anticipated Barriers for therapy: none    Medical Necessity is demonstrated by the following  History  Co-morbidities and personal factors that may impact the plan of care Co-morbidities:   none    Personal Factors:   no deficits     low   Examination  Body Structures and Functions, activity limitations and participation restrictions that may impact the plan of care Body Regions:   lower extremities    Body Systems:    ROM  strength  gait    Participation Restrictions:   none    Activity limitations:   Learning and applying knowledge  no deficits    General Tasks and Commands  no deficits    Communication  no deficits    Mobility  no deficits    Self care  no deficits    Domestic Life  no deficits    Interactions/Relationships  no deficits    Life Areas  no deficits    Community and Social Life  no deficits         low   Clinical Presentation stable and uncomplicated low    Decision Making/ Complexity Score: low     Goals:  Short Term Goals: 5 weeks   1. The patient will begin a written HEP  2. Increase knee ROM to 0* - 90*  3. Eliminate soft tissue tenderness     Long Term Goals: 10 weeks   1. Increase knee flexion to 130*  2. Increase knee strength to 4+/5  3. The patient will ambulate on a community level without AD .  4. Decrease FOTO score to < 40%. ( G code CJ )    Plan   Plan of care Certification: 9/12/2019 to 11/29/2019.    Outpatient Physical Therapy 2 times weekly for 10 weeks to include the following interventions: Gait Training, Moist Heat/ Ice, Patient Education, Therapeutic Activites and Therapeutic Exercise.     Thank you for this referral,    Joaquín Gerardo, PT

## 2019-09-16 ENCOUNTER — CLINICAL SUPPORT (OUTPATIENT)
Dept: REHABILITATION | Facility: HOSPITAL | Age: 59
End: 2019-09-16
Attending: ORTHOPAEDIC SURGERY
Payer: COMMERCIAL

## 2019-09-16 DIAGNOSIS — R26.9 GAIT ABNORMALITY: ICD-10-CM

## 2019-09-16 DIAGNOSIS — S83.232A COMPLEX TEAR OF MEDIAL MENISCUS OF LEFT KNEE AS CURRENT INJURY, INITIAL ENCOUNTER: ICD-10-CM

## 2019-09-16 PROCEDURE — 97110 THERAPEUTIC EXERCISES: CPT | Mod: PN | Performed by: PHYSICAL THERAPIST

## 2019-09-16 NOTE — PROGRESS NOTES
Physical Therapy Daily Treatment Note     Name: Akua Quick [facility] Number: [id number]    Therapy Diagnosis:   Encounter Diagnoses   Name Primary?    Complex tear of medial meniscus of left knee as current injury, initial encounter     Gait abnormality      Physician: Nahum Espinosa II, MD    Visit Date: 9/16/2019    Physician Orders: PT Eval and Treat as per protocol  Medical Diagnosis from Referral: Complex tear of medial meniscus of left knee as current injury, initial encounter  Evaluation Date: 9/12/2019  Authorization Period Expiration: 12/31/2019  Plan of Care Expiration: 11/29/2019  Visit # / Visits authorized: 2/ 20      Time In: 1010  Time Out: 1105  Total Billable Time: 55 minutes    Precautions: Standard    Subjective     Pt reports: No complaints of pain.  She was compliant with home exercise program.  Response to previous treatment: no complaints  Functional change:     Pain: 0/10  Location: left knee      Objective     Akua received therapeutic exercises to develop strength, endurance, ROM and core stabilization for 55 minutes including:    Long sitting HSS 3 x 30 sec  Long sitting GSS 3 x 30 sec  Quad sets 3 x 10  SLR 3 x 10  SL  Abduction 3 x 10  Prone hip extension 3 x 10  Hook lying abduction with RTB 3 x 10  Adductor squeeze 3 x 10  Heel slides 3 x 10  Hamstring sets on foam roll 3 x 10  TKE on foam roll 3 x 10  PB crunch 3 x 10  PB oblique crunch 3 x 10  UBE 4 min forward/ 4 min backward            Home Exercises Provided and Patient Education Provided     Education provided:   -     Written Home Exercises Provided: Patient instructed to cont prior HEP.  Exercises were reviewed and Akua was able to demonstrate them prior to the end of the session.  Akua demonstrated good  understanding of the education provided.     See EMR under Patient Instructions for exercises provided prior visit.    Assessment       Akua is progressing well towards her goals.   Pt  prognosis is Good.     Pt will continue to benefit from skilled outpatient physical therapy to address the deficits listed in the problem list box on initial evaluation, provide pt/family education and to maximize pt's level of independence in the home and community environment.     Pt's spiritual, cultural and educational needs considered and pt agreeable to plan of care and goals.    Anticipated barriers to physical therapy: none    Goals:   Short Term Goals: 5 weeks   1. The patient will begin a written HEP  2. Increase knee ROM to 0* - 90*  3. Eliminate soft tissue tenderness      Long Term Goals: 10 weeks   1. Increase knee flexion to 130*  2. Increase knee strength to 4+/5  3. The patient will ambulate on a community level without AD .  4. Decrease FOTO score to < 40%. ( G code CJ )      Plan     Continue with physical therapy as per plan of care    Joaquín Gerardo, PT

## 2019-09-18 ENCOUNTER — PATIENT OUTREACH (OUTPATIENT)
Dept: ADMINISTRATIVE | Facility: HOSPITAL | Age: 59
End: 2019-09-18

## 2019-09-19 ENCOUNTER — CLINICAL SUPPORT (OUTPATIENT)
Dept: REHABILITATION | Facility: HOSPITAL | Age: 59
End: 2019-09-19
Attending: ORTHOPAEDIC SURGERY
Payer: COMMERCIAL

## 2019-09-19 DIAGNOSIS — S83.232A COMPLEX TEAR OF MEDIAL MENISCUS OF LEFT KNEE AS CURRENT INJURY, INITIAL ENCOUNTER: ICD-10-CM

## 2019-09-19 DIAGNOSIS — R26.9 GAIT ABNORMALITY: ICD-10-CM

## 2019-09-19 PROCEDURE — 97110 THERAPEUTIC EXERCISES: CPT | Mod: PN

## 2019-09-19 NOTE — PROGRESS NOTES
Physical Therapy Daily Treatment Note     Name: Akua Quick [facility] Number: [id number]    Therapy Diagnosis:   Encounter Diagnoses   Name Primary?    Complex tear of medial meniscus of left knee as current injury, initial encounter     Gait abnormality      Physician: Nahum Espinosa II, MD    Visit Date: 9/19/2019    Physician Orders: PT Eval and Treat as per protocol  Medical Diagnosis from Referral: Complex tear of medial meniscus of left knee as current injury, initial encounter  Evaluation Date: 9/12/2019  Authorization Period Expiration: 12/31/2019  Plan of Care Expiration: 11/29/2019  Visit # / Visits authorized: 3/ 20      Time In: 1000  Time Out: 1100  Total Billable Time: 55 minutes    Precautions: Standard    Subjective     Pt reports: No complaints of pain.  She was compliant with home exercise program.  Response to previous treatment: no complaints  Functional change:     Pain: 0/10  Location: left knee      Objective     Akua received therapeutic exercises to develop strength, endurance, ROM and core stabilization for 55 minutes including:    Long sitting HSS 3 x 30 sec  Long sitting GSS 3 x 30 sec  Quad sets 3 x 10  SLR 3 x 10  SL  Abduction 3 x 10  Prone hip extension 3 x 10  Hook lying abduction with RTB 3 x 10  Adductor squeeze 3 x 10  Heel slides 3 x 10  Hamstring sets on foam roll 3 x 10  TKE on foam roll 3 x 10  PB crunch 3 x 10  PB oblique crunch 3 x 10  Ankle DF/PF RTB x 30   UBE 4 min forward/ 4 min backward      Home Exercises Provided and Patient Education Provided     Education provided:   -     Written Home Exercises Provided: Patient instructed to cont prior HEP.  Exercises were reviewed and Auka was able to demonstrate them prior to the end of the session.  Akua demonstrated good  understanding of the education provided.     See EMR under Patient Instructions for exercises provided prior visit.    Assessment   Patient arrived to PT holding her brace,  patient was reminded she is to wear her brace at all times and reminded of her weightbearing status. Patient tolerated treatment well today with no onset of adverse effects or reports of increased pain. Will cont to progress per patient's tolerance.     Akua is progressing well towards her goals.   Pt prognosis is Good.     Pt will continue to benefit from skilled outpatient physical therapy to address the deficits listed in the problem list box on initial evaluation, provide pt/family education and to maximize pt's level of independence in the home and community environment.     Pt's spiritual, cultural and educational needs considered and pt agreeable to plan of care and goals.    Anticipated barriers to physical therapy: none    Goals:   Short Term Goals: 5 weeks   1. The patient will begin a written HEP  2. Increase knee ROM to 0* - 90*  3. Eliminate soft tissue tenderness      Long Term Goals: 10 weeks   1. Increase knee flexion to 130*  2. Increase knee strength to 4+/5  3. The patient will ambulate on a community level without AD .  4. Decrease FOTO score to < 40%. ( G code CJ )      Plan     Continue with physical therapy as per plan of care    Elba Sanders, PTA

## 2019-09-23 ENCOUNTER — CLINICAL SUPPORT (OUTPATIENT)
Dept: REHABILITATION | Facility: HOSPITAL | Age: 59
End: 2019-09-23
Attending: ORTHOPAEDIC SURGERY
Payer: COMMERCIAL

## 2019-09-23 DIAGNOSIS — S83.232A COMPLEX TEAR OF MEDIAL MENISCUS OF LEFT KNEE AS CURRENT INJURY, INITIAL ENCOUNTER: ICD-10-CM

## 2019-09-23 DIAGNOSIS — R26.9 GAIT ABNORMALITY: ICD-10-CM

## 2019-09-23 PROCEDURE — 97110 THERAPEUTIC EXERCISES: CPT | Mod: PN

## 2019-09-23 NOTE — PROGRESS NOTES
Physical Therapy Daily Treatment Note     Name: Akua Quick [facility] Number: [id number]    Therapy Diagnosis:   Encounter Diagnoses   Name Primary?    Complex tear of medial meniscus of left knee as current injury, initial encounter     Gait abnormality      Physician: Nahum Espinosa II, MD    Visit Date: 9/23/2019    Physician Orders: PT Eval and Treat as per protocol  Medical Diagnosis from Referral: Complex tear of medial meniscus of left knee as current injury, initial encounter  Evaluation Date: 9/12/2019  Authorization Period Expiration: 12/31/2019  Plan of Care Expiration: 11/29/2019  Visit # / Visits authorized: 4/ 20      Time In: 9:58 AM  Time Out: 10:58 AM  Total Billable Time: 55 minutes    Precautions: Standard    Subjective     Pt reports: No new complaints and reports pain is mild. Patient reports she is not remaining complaint with wearing her brace at home.   She was compliant with home exercise program.  Response to previous treatment: no complaints  Functional change:     Pain: 0/10  Location: left knee      Objective     Akua received therapeutic exercises to develop strength, endurance, ROM and core stabilization for 55 minutes including:    Long sitting HSS 3 x 30 sec  Long sitting GSS 3 x 30 sec  Quad sets 3 x 10  SLR 3 x 10  SL  Abduction 3 x 10  Prone hip extension 3 x 10  Hook lying abduction with RTB 3 x 10  Adductor squeeze 3 x 10  Heel slides 3 x 10  Hamstring sets on foam roll 3 x 10  TKE on foam roll 3 x 10  PB crunch 3 x 10  PB oblique crunch 3 x 10  Ankle DF/PF RTB x 30   UBE 4 min forward/ 4 min backward      Home Exercises Provided and Patient Education Provided     Education provided:   - pt educated that she is to wear her brace at all times even at night.     Written Home Exercises Provided: Patient instructed to cont prior HEP.  Exercises were reviewed and Akua was able to demonstrate them prior to the end of the session.  Akua demonstrated good   understanding of the education provided.     See EMR under Patient Instructions for exercises provided prior visit.    Assessment   Patient arrived wearing brace today however reports she is not wearing it at home. Patient educated again on proper use of brace. Patient with good tolerance to all exercises performed today. Will cont to progress per patient's tolerance.     Akua is progressing well towards her goals.   Pt prognosis is Good.     Pt will continue to benefit from skilled outpatient physical therapy to address the deficits listed in the problem list box on initial evaluation, provide pt/family education and to maximize pt's level of independence in the home and community environment.     Pt's spiritual, cultural and educational needs considered and pt agreeable to plan of care and goals.    Anticipated barriers to physical therapy: none    Goals:   Short Term Goals: 5 weeks   1. The patient will begin a written HEP  2. Increase knee ROM to 0* - 90*  3. Eliminate soft tissue tenderness      Long Term Goals: 10 weeks   1. Increase knee flexion to 130*  2. Increase knee strength to 4+/5  3. The patient will ambulate on a community level without AD .  4. Decrease FOTO score to < 40%. ( G code CJ )      Plan     Continue with physical therapy as per plan of care    Elba Sanders, PTA

## 2019-09-26 ENCOUNTER — CLINICAL SUPPORT (OUTPATIENT)
Dept: REHABILITATION | Facility: HOSPITAL | Age: 59
End: 2019-09-26
Attending: ORTHOPAEDIC SURGERY
Payer: COMMERCIAL

## 2019-09-26 DIAGNOSIS — R26.9 GAIT ABNORMALITY: ICD-10-CM

## 2019-09-26 DIAGNOSIS — S83.232A COMPLEX TEAR OF MEDIAL MENISCUS OF LEFT KNEE AS CURRENT INJURY, INITIAL ENCOUNTER: ICD-10-CM

## 2019-09-26 PROCEDURE — 97110 THERAPEUTIC EXERCISES: CPT | Mod: PN

## 2019-09-26 NOTE — PROGRESS NOTES
Physical Therapy Daily Treatment Note     Name: Akua Quick [facility] Number: [id number]    Therapy Diagnosis:   Encounter Diagnoses   Name Primary?    Complex tear of medial meniscus of left knee as current injury, initial encounter     Gait abnormality      Physician: Nahum Espinosa II, MD    Visit Date: 9/26/2019    Physician Orders: PT Eval and Treat as per protocol  Medical Diagnosis from Referral: Complex tear of medial meniscus of left knee as current injury, initial encounter  Evaluation Date: 9/12/2019  Authorization Period Expiration: 12/31/2019  Plan of Care Expiration: 11/29/2019  Visit # / Visits authorized: 5/ 20      Time In: 10:00 AM  Time Out: 11:02 AM  Total Billable Time: 55 minutes    Precautions: Standard    Subjective     Pt reports: No new complaints and reports she has been wearing her brace appropriately.   She was compliant with home exercise program.  Response to previous treatment: no complaints  Functional change:     Pain: 0/10  Location: left knee      Objective     Akua received therapeutic exercises to develop strength, endurance, ROM and core stabilization for 55 minutes including:    Long sitting HSS 3 x 30 sec  Long sitting GSS 3 x 30 sec  Quad sets 3 x 10  SLR 3 x 10  SL  Abduction 3 x 10  Prone hip extension 3 x 10  SL Adduction 3x10   Hook lying abduction with GTB 3 x 10  Adductor squeeze 3 x 10  Heel slides 3 x 10  Hamstring sets on foam roll 3 x 10  TKE on foam roll 3 x 10  PB crunch 3 x 10  PB oblique crunch 3 x 10  Ankle DF/PF BTB x 30   UBE 2 min forward/ 2 min backward      Home Exercises Provided and Patient Education Provided     Education provided:   - pt educated that she is to wear her brace at all times even at night.     Written Home Exercises Provided: Patient instructed to cont prior HEP.  Exercises were reviewed and Akua was able to demonstrate them prior to the end of the session.  Akua demonstrated good  understanding of the  education provided.     See EMR under Patient Instructions for exercises provided prior visit.    Assessment   Patient tolerated all exercises well today with no onset of adverse effects. Will cont to progress per patient's tolerance.     Akua is progressing well towards her goals.   Pt prognosis is Good.     Pt will continue to benefit from skilled outpatient physical therapy to address the deficits listed in the problem list box on initial evaluation, provide pt/family education and to maximize pt's level of independence in the home and community environment.     Pt's spiritual, cultural and educational needs considered and pt agreeable to plan of care and goals.    Anticipated barriers to physical therapy: none    Goals:   Short Term Goals: 5 weeks   1. The patient will begin a written HEP  2. Increase knee ROM to 0* - 90*  3. Eliminate soft tissue tenderness      Long Term Goals: 10 weeks   1. Increase knee flexion to 130*  2. Increase knee strength to 4+/5  3. The patient will ambulate on a community level without AD .  4. Decrease FOTO score to < 40%. ( G code CJ )      Plan     Continue with physical therapy as per plan of care    Elba Sanders, PTA

## 2019-09-30 ENCOUNTER — CLINICAL SUPPORT (OUTPATIENT)
Dept: REHABILITATION | Facility: HOSPITAL | Age: 59
End: 2019-09-30
Attending: ORTHOPAEDIC SURGERY
Payer: COMMERCIAL

## 2019-09-30 DIAGNOSIS — S83.232A COMPLEX TEAR OF MEDIAL MENISCUS OF LEFT KNEE AS CURRENT INJURY, INITIAL ENCOUNTER: ICD-10-CM

## 2019-09-30 DIAGNOSIS — R26.9 GAIT ABNORMALITY: ICD-10-CM

## 2019-09-30 PROCEDURE — 97110 THERAPEUTIC EXERCISES: CPT | Mod: PN

## 2019-09-30 NOTE — PROGRESS NOTES
Physical Therapy Daily Treatment Note     Name: Akua Quick [facility] Number: [id number]    Therapy Diagnosis:   Encounter Diagnoses   Name Primary?    Complex tear of medial meniscus of left knee as current injury, initial encounter     Gait abnormality      Physician: Nahum Espinosa II, MD    Visit Date: 9/30/2019    Physician Orders: PT Eval and Treat as per protocol  Medical Diagnosis from Referral: Complex tear of medial meniscus of left knee as current injury, initial encounter  Evaluation Date: 9/12/2019  Authorization Period Expiration: 12/31/2019  Plan of Care Expiration: 11/29/2019  Visit # / Visits authorized: 6/ 20      Time In: 10:00 AM  Time Out: 10:55 AM  Total Billable Time: 55 minutes    Precautions: Standard    Subjective     Pt reports: no pain but reports some swelling in the leg that she thinks is due to wrapping her knee too tight over night.   She was compliant with home exercise program.  Response to previous treatment: no complaints  Functional change:     Pain: 0/10  Location: left knee      Objective     Akua received therapeutic exercises to develop strength, endurance, ROM and core stabilization for 55 minutes including:    Long sitting HSS 3 x 30 sec  Long sitting GSS 3 x 30 sec  Quad sets 3 x 10  SLR 3 x 10  SL  Abduction 3 x 10  Prone hip extension 3 x 10  SL Adduction 3x10   Hook lying abduction with GTB 3 x 10  Adductor squeeze 3 x 10  Heel slides 3 x 10  Hamstring sets on foam roll 3 x 10  TKE on foam roll 3 x 10  PB crunch 3 x 10  PB oblique crunch 3 x 10  Ankle DF/PF BTB x 30   UBE 4 min forward/ 4 min backward      Home Exercises Provided and Patient Education Provided     Education provided:   - pt educated that she is to wear her brace at all times even at night.     Written Home Exercises Provided: Patient instructed to cont prior HEP.  Exercises were reviewed and Akua was able to demonstrate them prior to the end of the session.  Akua  demonstrated good  understanding of the education provided.     See EMR under Patient Instructions for exercises provided prior visit.    Assessment   Patient tolerated all exercises well today with no onset of adverse effects. Will cont to progress per patient's tolerance.     Akua is progressing well towards her goals.   Pt prognosis is Good.     Pt will continue to benefit from skilled outpatient physical therapy to address the deficits listed in the problem list box on initial evaluation, provide pt/family education and to maximize pt's level of independence in the home and community environment.     Pt's spiritual, cultural and educational needs considered and pt agreeable to plan of care and goals.    Anticipated barriers to physical therapy: none    Goals:   Short Term Goals: 5 weeks   1. The patient will begin a written HEP  2. Increase knee ROM to 0* - 90*  3. Eliminate soft tissue tenderness      Long Term Goals: 10 weeks   1. Increase knee flexion to 130*  2. Increase knee strength to 4+/5  3. The patient will ambulate on a community level without AD .  4. Decrease FOTO score to < 40%. ( G code CJ )      Plan     Continue with physical therapy as per plan of care    Elba Sanders, PTA

## 2019-10-03 ENCOUNTER — CLINICAL SUPPORT (OUTPATIENT)
Dept: REHABILITATION | Facility: HOSPITAL | Age: 59
End: 2019-10-03
Attending: ORTHOPAEDIC SURGERY
Payer: COMMERCIAL

## 2019-10-03 DIAGNOSIS — S83.232A COMPLEX TEAR OF MEDIAL MENISCUS OF LEFT KNEE AS CURRENT INJURY, INITIAL ENCOUNTER: ICD-10-CM

## 2019-10-03 DIAGNOSIS — R26.9 GAIT ABNORMALITY: ICD-10-CM

## 2019-10-03 PROCEDURE — 97110 THERAPEUTIC EXERCISES: CPT | Mod: PN | Performed by: PHYSICAL THERAPIST

## 2019-10-03 NOTE — PROGRESS NOTES
Physical Therapy Daily Treatment Note     Name: Akua Quick [facility] Number: [id number]    Therapy Diagnosis:   Encounter Diagnoses   Name Primary?    Complex tear of medial meniscus of left knee as current injury, initial encounter     Gait abnormality      Physician: Nahum Espinosa II, MD    Visit Date: 10/3/2019    Physician Orders: PT Eval and Treat as per protocol  Medical Diagnosis from Referral: Complex tear of medial meniscus of left knee as current injury, initial encounter  Evaluation Date: 9/12/2019  Authorization Period Expiration: 12/31/2019  Plan of Care Expiration: 11/29/2019  Visit # / Visits authorized: 2/ 20      Time In: 1004  Time Out: 1100  Total Billable Time: 56 minutes    Precautions: Standard    Subjective     Pt reports: No complaints of pain.  She was compliant with home exercise program.  Response to previous treatment: no complaints  Functional change:     Pain: 0/10  Location: left knee      Objective     Akua received therapeutic exercises to develop strength, endurance, ROM and core stabilization for 56 minutes including:    Long sitting HSS 3 x 30 sec  Long sitting GSS 3 x 30 sec  Quad sets 3 x 10  SLR 3 x 10  SL  Abduction 3 x 10  Prone hip extension 3 x 10  Hook lying abduction with RTB 3 x 10  Adductor squeeze 3 x 10  Heel slides 3 x 10  Hamstring sets on foam roll 3 x 10  TKE on foam roll 3 x 10  PB crunch 3 x 10  PB oblique crunch 3 x 10  UBE 4 min forward/ 4 min backward  Ankle DF, IN, EV with GTB 3 x 10      Home Exercises Provided and Patient Education Provided     Education provided:   -     Written Home Exercises Provided: Patient instructed to cont prior HEP.  Exercises were reviewed and Akua was able to demonstrate them prior to the end of the session.  Akua demonstrated good  understanding of the education provided.     See EMR under Patient Instructions for exercises provided prior visit.    Assessment       Akua is progressing well  towards her goals.   Pt prognosis is Good.     Pt will continue to benefit from skilled outpatient physical therapy to address the deficits listed in the problem list box on initial evaluation, provide pt/family education and to maximize pt's level of independence in the home and community environment.     Pt's spiritual, cultural and educational needs considered and pt agreeable to plan of care and goals.    Anticipated barriers to physical therapy: none    Goals:   Short Term Goals: 5 weeks   1. The patient will begin a written HEP  2. Increase knee ROM to 0* - 90*  3. Eliminate soft tissue tenderness      Long Term Goals: 10 weeks   1. Increase knee flexion to 130*  2. Increase knee strength to 4+/5  3. The patient will ambulate on a community level without AD .  4. Decrease FOTO score to < 40%. ( G code CJ )      Plan     Continue with physical therapy as per plan of care    Joaquín Gerardo, PT

## 2019-10-07 ENCOUNTER — CLINICAL SUPPORT (OUTPATIENT)
Dept: REHABILITATION | Facility: HOSPITAL | Age: 59
End: 2019-10-07
Attending: ORTHOPAEDIC SURGERY
Payer: COMMERCIAL

## 2019-10-07 DIAGNOSIS — S83.232A COMPLEX TEAR OF MEDIAL MENISCUS OF LEFT KNEE AS CURRENT INJURY, INITIAL ENCOUNTER: ICD-10-CM

## 2019-10-07 DIAGNOSIS — R26.9 GAIT ABNORMALITY: ICD-10-CM

## 2019-10-07 PROCEDURE — 97110 THERAPEUTIC EXERCISES: CPT | Mod: PN

## 2019-10-07 NOTE — PROGRESS NOTES
Physical Therapy Daily Treatment Note     Name: Akua Quick [facility] Number: [id number]    Therapy Diagnosis:   Encounter Diagnoses   Name Primary?    Complex tear of medial meniscus of left knee as current injury, initial encounter     Gait abnormality      Physician: Nahum Espinosa II, MD    Visit Date: 10/7/2019    Physician Orders: PT Eval and Treat as per protocol  Medical Diagnosis from Referral: Complex tear of medial meniscus of left knee as current injury, initial encounter  Evaluation Date: 9/12/2019  Authorization Period Expiration: 12/31/2019  Plan of Care Expiration: 11/29/2019  Visit # / Visits authorized: 8/ 20      Time In: 1002  Time Out: 1059   Total Billable Time: 30 minutes    Precautions: Standard    Subjective     Pt reports: no pain upon arrival.   She was compliant with home exercise program.  Response to previous treatment: no complaints  Functional change:     Pain: 0/10  Location: left knee      Objective     Akua received therapeutic exercises to develop strength, endurance, ROM and core stabilization for 56 minutes including:    Long sitting HSS 3 x 30 sec  Long sitting GSS 3 x 30 sec  Quad sets 3 x 10  SLR 3 x 10  SL  Abduction 3 x 10  Prone hip extension 3 x 10  Hook lying abduction with RTB 3 x 10  Adductor squeeze 3 x 10  Heel slides 3 x 10  Hamstring sets on foam roll 3 x 10  TKE on foam roll 3 x 10  PB crunch 3 x 10  PB oblique crunch 3 x 10  UBE 4 min forward/ 4 min backward  Ankle DF, IN, EV with GTB 3 x 10      Home Exercises Provided and Patient Education Provided     Education provided:   -     Written Home Exercises Provided: Patient instructed to cont prior HEP.  Exercises were reviewed and Akua was able to demonstrate them prior to the end of the session.  Akua demonstrated good  understanding of the education provided.     See EMR under Patient Instructions for exercises provided prior visit.    Assessment   Patient tolerated treatment well  today with no onset of adverse effects or pain.     Akua is progressing well towards her goals.   Pt prognosis is Good.     Pt will continue to benefit from skilled outpatient physical therapy to address the deficits listed in the problem list box on initial evaluation, provide pt/family education and to maximize pt's level of independence in the home and community environment.     Pt's spiritual, cultural and educational needs considered and pt agreeable to plan of care and goals.    Anticipated barriers to physical therapy: none    Goals:   Short Term Goals: 5 weeks   1. The patient will begin a written HEP  2. Increase knee ROM to 0* - 90*  3. Eliminate soft tissue tenderness      Long Term Goals: 10 weeks   1. Increase knee flexion to 130*  2. Increase knee strength to 4+/5  3. The patient will ambulate on a community level without AD .  4. Decrease FOTO score to < 40%. ( G code CJ )      Plan     Continue with physical therapy as per plan of care    Elba Sanders, PTA

## 2019-10-08 ENCOUNTER — OFFICE VISIT (OUTPATIENT)
Dept: ORTHOPEDICS | Facility: CLINIC | Age: 59
End: 2019-10-08
Payer: COMMERCIAL

## 2019-10-08 VITALS
BODY MASS INDEX: 30.21 KG/M2 | SYSTOLIC BLOOD PRESSURE: 107 MMHG | HEIGHT: 61 IN | DIASTOLIC BLOOD PRESSURE: 55 MMHG | WEIGHT: 160 LBS | HEART RATE: 71 BPM

## 2019-10-08 DIAGNOSIS — S83.232D COMPLEX TEAR OF MEDIAL MENISCUS OF LEFT KNEE AS CURRENT INJURY, SUBSEQUENT ENCOUNTER: Primary | ICD-10-CM

## 2019-10-08 PROCEDURE — 99999 PR PBB SHADOW E&M-EST. PATIENT-LVL III: ICD-10-PCS | Mod: PBBFAC,,, | Performed by: ORTHOPAEDIC SURGERY

## 2019-10-08 PROCEDURE — 99024 PR POST-OP FOLLOW-UP VISIT: ICD-10-PCS | Mod: S$GLB,,, | Performed by: ORTHOPAEDIC SURGERY

## 2019-10-08 PROCEDURE — 99999 PR PBB SHADOW E&M-EST. PATIENT-LVL III: CPT | Mod: PBBFAC,,, | Performed by: ORTHOPAEDIC SURGERY

## 2019-10-08 PROCEDURE — 99024 POSTOP FOLLOW-UP VISIT: CPT | Mod: S$GLB,,, | Performed by: ORTHOPAEDIC SURGERY

## 2019-10-08 NOTE — PROGRESS NOTES
CC:  59-year-old female follows up status post left knee arthroscopy with meniscal root repair of the medial meniscus.  Date of surgery was 09/04/2019.    Examination of the Left Lower Extremity:     Motor function is intact distally EHL/FHL/TA/shamika   +2 dorsalis pedis and posterior tibial pulses   Sensation to light touch intact distally dorsal, plantar, and first web space     Examination of the Left knee:    ROM 0 - 90  Effusion positive  Tenderness to palpation at the joint line negative  Pain during range of motion negative  Crepitation during range of motion negative     positive increased pain noted with flexion past 90   positive antalgic gait noted   negative Varus/Valgus instability    Dx:  Status post meniscal root repair of the medial meniscus of the left knee, stable    Plan:  The can begin working on deep flexion. She can begin weight-bearing as tolerated.  Follow-up in 4 weeks.

## 2019-10-17 ENCOUNTER — TELEPHONE (OUTPATIENT)
Dept: ADMINISTRATIVE | Facility: OTHER | Age: 59
End: 2019-10-17

## 2019-10-17 ENCOUNTER — TELEPHONE (OUTPATIENT)
Dept: REHABILITATION | Facility: HOSPITAL | Age: 59
End: 2019-10-17

## 2019-11-05 ENCOUNTER — CLINICAL SUPPORT (OUTPATIENT)
Dept: URGENT CARE | Facility: CLINIC | Age: 59
End: 2019-11-05
Payer: COMMERCIAL

## 2019-11-05 ENCOUNTER — TELEPHONE (OUTPATIENT)
Dept: FAMILY MEDICINE | Facility: CLINIC | Age: 59
End: 2019-11-05

## 2019-11-05 ENCOUNTER — OFFICE VISIT (OUTPATIENT)
Dept: ORTHOPEDICS | Facility: CLINIC | Age: 59
End: 2019-11-05
Payer: COMMERCIAL

## 2019-11-05 VITALS
DIASTOLIC BLOOD PRESSURE: 70 MMHG | BODY MASS INDEX: 29.51 KG/M2 | HEART RATE: 81 BPM | WEIGHT: 156.31 LBS | HEIGHT: 61 IN | SYSTOLIC BLOOD PRESSURE: 156 MMHG

## 2019-11-05 VITALS
TEMPERATURE: 99 F | WEIGHT: 156 LBS | OXYGEN SATURATION: 96 % | SYSTOLIC BLOOD PRESSURE: 109 MMHG | RESPIRATION RATE: 16 BRPM | HEART RATE: 67 BPM | DIASTOLIC BLOOD PRESSURE: 76 MMHG | BODY MASS INDEX: 29.48 KG/M2

## 2019-11-05 DIAGNOSIS — R30.0 DYSURIA: ICD-10-CM

## 2019-11-05 DIAGNOSIS — S83.232D COMPLEX TEAR OF MEDIAL MENISCUS OF LEFT KNEE AS CURRENT INJURY, SUBSEQUENT ENCOUNTER: Primary | ICD-10-CM

## 2019-11-05 DIAGNOSIS — R35.0 FREQUENT URINATION: Primary | ICD-10-CM

## 2019-11-05 DIAGNOSIS — M54.50 ACUTE LOW BACK PAIN WITHOUT SCIATICA, UNSPECIFIED BACK PAIN LATERALITY: ICD-10-CM

## 2019-11-05 LAB
BILIRUB UR QL STRIP: NEGATIVE
GLUCOSE UR QL STRIP: NEGATIVE
KETONES UR QL STRIP: NEGATIVE
LEUKOCYTE ESTERASE UR QL STRIP: NEGATIVE
PH, POC UA: 5.5
POC BLOOD, URINE: POSITIVE
POC NITRATES, URINE: NEGATIVE
PROT UR QL STRIP: NEGATIVE
SP GR UR STRIP: 1.01 (ref 1–1.03)
UROBILINOGEN UR STRIP-ACNC: ABNORMAL (ref 0.1–1.1)

## 2019-11-05 PROCEDURE — 81003 POCT URINALYSIS, DIPSTICK, AUTOMATED, W/O SCOPE: ICD-10-PCS | Mod: QW,S$GLB,, | Performed by: NURSE PRACTITIONER

## 2019-11-05 PROCEDURE — 99204 PR OFFICE/OUTPT VISIT, NEW, LEVL IV, 45-59 MIN: ICD-10-PCS | Mod: 25,S$GLB,, | Performed by: NURSE PRACTITIONER

## 2019-11-05 PROCEDURE — 99999 PR PBB SHADOW E&M-EST. PATIENT-LVL III: ICD-10-PCS | Mod: PBBFAC,,, | Performed by: ORTHOPAEDIC SURGERY

## 2019-11-05 PROCEDURE — 99204 OFFICE O/P NEW MOD 45 MIN: CPT | Mod: 25,S$GLB,, | Performed by: NURSE PRACTITIONER

## 2019-11-05 PROCEDURE — 81003 URINALYSIS AUTO W/O SCOPE: CPT | Mod: QW,S$GLB,, | Performed by: NURSE PRACTITIONER

## 2019-11-05 PROCEDURE — 99999 PR PBB SHADOW E&M-EST. PATIENT-LVL III: CPT | Mod: PBBFAC,,, | Performed by: ORTHOPAEDIC SURGERY

## 2019-11-05 PROCEDURE — 99024 POSTOP FOLLOW-UP VISIT: CPT | Mod: S$GLB,,, | Performed by: ORTHOPAEDIC SURGERY

## 2019-11-05 PROCEDURE — 99024 PR POST-OP FOLLOW-UP VISIT: ICD-10-PCS | Mod: S$GLB,,, | Performed by: ORTHOPAEDIC SURGERY

## 2019-11-05 RX ORDER — PHENAZOPYRIDINE HYDROCHLORIDE 200 MG/1
200 TABLET, FILM COATED ORAL 3 TIMES DAILY PRN
Qty: 12 TABLET | Refills: 0 | Status: SHIPPED | OUTPATIENT
Start: 2019-11-05 | End: 2019-11-09

## 2019-11-05 RX ORDER — CEPHALEXIN 500 MG/1
500 CAPSULE ORAL EVERY 12 HOURS
Qty: 14 CAPSULE | Refills: 0 | Status: SHIPPED | OUTPATIENT
Start: 2019-11-05 | End: 2019-11-12

## 2019-11-05 NOTE — PATIENT INSTRUCTIONS
"  Disuria     El dolor al orinar (disuria) suele ser consecuencia de un problema en las vías urinarias.   La disuria consiste en lady sensación dolorosa al orinar. Siga leyendo para obtener más información sobre la disuria y goncalves tratamiento.  ¿Cuáles son las causas de la disuria?  Las posibles causas incluyen:  · Infección con lady bacteria o virus. Puede producir lady infección de las vías urinarias ("UTI", por phu siglas en inglés) o lady infección de transmisión sexual ("STI", por phu siglas en inglés).  · Sensibilidad o alergias a ciertas sustancias químicas. Pueden estar en las lociones u otros productos.  · Problemas de la próstata o de la vejiga.  · Radioterapia en la radha pélvica.   ¿Cómo se diagnostica la disuria?  Goncalves proveedor de atención médica le hará un examen y le preguntará sobre phu síntomas y phu antecedentes de iman. Luego de realizarle un examen físico y de hablar con usted, probablemente goncalves proveedor de atención médica sabrá las causas de goncalves disuria. Generalmente le pedirá lady muestra de goncalves orina. Se realizan exámenes, o "análisis de orina" con kenia muestra. Bonita examen puede incluir: observar la muestra de orina (examen visual), verificar la presencia de sustancias (examen químico) y observar lady cantidad pequeña con un microscopio (examen microscópico). Algunas partes del análisis de orina se pueden realizar en el consultorio del proveedor y otras se harán en un laboratorio. También es posible que la muestra de orina se analice para determinar si tiene bacterias y hongos levaduriformes (cultivo de orina). Goncalves proveedor de atención médica le explicará con más detalle estas pruebas en duane de que usted las necesite.  ¿Cómo se trata la disuria?  El tratamiento dependerá de la causa de goncalves disuria. Si la causa es lady infección por bacterias, es posible que le receten antibióticos (medicamentos para combatir la infección). También podrían darle medicamentos para facilitarle orinar y ayudarle a aliviar el " dolor. Goncalves proveedor de atención médica puede darle más información sobre phu opciones de tratamiento. Si los síntomas se josé miguel sin tratar, pueden empeorar.  Llame a goncalves proveedor de atención médica de inmediato en cualquiera de los siguientes casos:  · Fiebre de 100.4 °F (38.0 °C) o superior  · No hay mejoría después de iona ana de tratamiento  · Dificultad para orinar a causa del dolor  · Secreción nueva o en aumento procedente de la vagina o del pene  · Erupción cutánea o dolor en las articulaciones  · Dolor que va en aumento en la espalda o en el abdomen  · Ganglios linfáticos agrandados (masas) y con dolor en las ingles   Date Last Reviewed: 9/24/2014  © 7677-1135 [employer]. [address] [place] [id number]. Todos los derechos reservados. Esta información no pretende sustituir la atención médica profesional. Sólo goncalves médico puede diagnosticar y tratar un problema de iman.

## 2019-11-05 NOTE — TELEPHONE ENCOUNTER
CHAYO Melendrez Staff; KIMI MORA Staff; KIMI Caputo Staff; KIMI Batista Staff             Hello. This patient has asked that she be referred to a Primary Care provider ASAP, as she has not been established and is having some urinary concerns. Patient has been referred to Urgent Care for immediate UTI workup, however, would like primary care follow up.     Are you able to get her scheduled this week some time?     Please let me know how to proceed.     Thanks so much! Elvi

## 2019-11-05 NOTE — TELEPHONE ENCOUNTER
Est care appt made 11/18/19              ----- Message from Elvi Garrido LPN sent at 11/5/2019  8:31 AM CST -----  Regarding: New patient referral  Hello. This patient has asked that she be referred to a Primary Care provider ASAP, as she has not been established and is having some urinary concerns. Patient has been referred to Urgent Care for immediate UTI workup, however, would like primary care follow up.     Are you able to get her scheduled this week some time?     Please let me know how to proceed.     Thanks so much! Elvi

## 2019-11-05 NOTE — TELEPHONE ENCOUNTER
Urgent care follow up appointment scheduled for the date of 11/11/19. Patient agreed to appointment date and time.

## 2019-11-05 NOTE — PROGRESS NOTES
CC:  59-year-old female status post left knee arthroscopy with meniscal root repair.  Overall patient is doing extremely well she has no pain.  She has finished physical therapy and wants to go back to work.    Examination of the Left Lower Extremity:     Motor function is intact distally EHL/FHL/TA/shamika   +2 dorsalis pedis and posterior tibial pulses   Sensation to light touch intact distally dorsal, plantar, and first web space     Examination of the Left knee:    ROM 0 - 150   Effusion negative  Tenderness to palpation at the joint line negative  Pain during range of motion negative  Crepitation during range of motion negative     negative increased pain noted with flexion past 90   negative antalgic gait noted   negative Lachman's Test   negative Anterior Drawer Test   negative Posterior Drawer Test   negative McMurrays Test   negative Disco Test   negative Varus/Valgus instability    Dx:  Status post left knee arthroscopy with meniscal root repair, stable and doing well    Plan:  Return to work full duty, no restrictions.  Follow up p.r.n..

## 2019-11-05 NOTE — TELEPHONE ENCOUNTER
----- Message from Elvi Garrido LPN sent at 11/5/2019  8:31 AM CST -----  Regarding: New patient referral  Hello. This patient has asked that she be referred to a Primary Care provider ASAP, as she has not been established and is having some urinary concerns. Patient has been referred to Urgent Care for immediate UTI workup, however, would like primary care follow up.     Are you able to get her scheduled this week some time?     Please let me know how to proceed.     Thanks so much! Elvi

## 2019-11-05 NOTE — TELEPHONE ENCOUNTER
----- Message from Laura Cameron sent at 11/5/2019 12:16 PM CST -----  Contact: Patient  Type:  Patient Returning Call    Who Called:  Patient  Who Left Message for Patient: Did not say  Does the patient know what this is regarding?:  She believes appointments  Best Call Back Number:  [phone]

## 2019-11-05 NOTE — LETTER
November 5, 2019      [facility]  [address] [place] [address] [address]  [facility] [address]  Phone: [phone]       Patient: Akua Anderson   YOB: 1960  Date of Visit: 11/05/2019    To Whom It May Concern:    Bernard Anderson  was at [facility] on 11/05/2019. She may return to work on 11/5/19 with full duty, no restrictions. If you have any questions or concerns, or if I can be of further assistance, please do not hesitate to contact me.    Sincerely,          MD KENYATTA Maradiaga IIA

## 2019-11-05 NOTE — PROGRESS NOTES
Subjective: frequent urination, odor in urine, lower back pain       Patient ID: Akua Anderson is a 59 y.o. female.    Vitals:  weight is 70.8 kg (156 lb). Her temperature is 99.1 °F (37.3 °C). Her blood pressure is 109/76 and her pulse is 67. Her respiration is 16 and oxygen saturation is 96%.     Chief Complaint: Urinary Tract Infection (frequent urination, odor in urine, lower back pain)    Patient's daughter is interpreting. States patient has been complaining of lower back pain, urinary frequency, pelvic pressure, and foul odor to urine for 1 week. Denies fever, nausea, vomiting, diarrhea, hematuria.     Urinary Tract Infection    This is a new problem. The current episode started in the past 7 days. Associated symptoms include flank pain and frequency. Pertinent negatives include no chills, nausea, urgency, vomiting or rash.       Constitution: Negative for chills, fatigue and fever.   HENT: Negative for congestion and sore throat.    Neck: Negative for painful lymph nodes.   Cardiovascular: Negative for chest pain and leg swelling.   Eyes: Negative for double vision and blurred vision.   Respiratory: Negative for cough and shortness of breath.    Gastrointestinal: Negative for nausea, vomiting and diarrhea.   Endocrine: negative.   Genitourinary: Positive for frequency and flank pain. Negative for dysuria, urgency and history of kidney stones.   Musculoskeletal: Positive for back pain. Negative for joint pain, joint swelling, muscle cramps and muscle ache.   Skin: Negative for color change, pale, rash and bruising.   Allergic/Immunologic: Negative for seasonal allergies.   Neurological: Negative for dizziness, history of vertigo, light-headedness, passing out and headaches.   Hematologic/Lymphatic: Negative for swollen lymph nodes.   Psychiatric/Behavioral: Negative for nervous/anxious, sleep disturbance and depression. The patient is not nervous/anxious.        Objective:      Physical Exam    Constitutional: She is oriented to person, place, and time. Vital signs are normal. She appears well-developed and well-nourished. She is cooperative.  Non-toxic appearance. She does not have a sickly appearance. She does not appear ill. No distress.   HENT:   Head: Normocephalic and atraumatic.   Right Ear: Hearing, tympanic membrane, external ear and ear canal normal.   Left Ear: Hearing, tympanic membrane, external ear and ear canal normal.   Nose: Nose normal. No mucosal edema, rhinorrhea or nasal deformity. No epistaxis. Right sinus exhibits no maxillary sinus tenderness and no frontal sinus tenderness. Left sinus exhibits no maxillary sinus tenderness and no frontal sinus tenderness.   Mouth/Throat: Uvula is midline, oropharynx is clear and moist and mucous membranes are normal. No trismus in the jaw. Normal dentition. No uvula swelling. No posterior oropharyngeal erythema.   Eyes: Conjunctivae and lids are normal. Right eye exhibits no discharge. Left eye exhibits no discharge. No scleral icterus.   Neck: Trachea normal, normal range of motion, full passive range of motion without pain and phonation normal. Neck supple.   Cardiovascular: Normal rate, regular rhythm, normal heart sounds, intact distal pulses and normal pulses.   Pulmonary/Chest: Effort normal and breath sounds normal. No respiratory distress.   Abdominal: Soft. Normal appearance and bowel sounds are normal. She exhibits no distension, no pulsatile midline mass and no mass. There is no tenderness. There is no CVA tenderness.   Musculoskeletal: Normal range of motion. She exhibits no edema or deformity.   Lymphadenopathy:     She has no cervical adenopathy.   Neurological: She is alert and oriented to person, place, and time. She exhibits normal muscle tone. Coordination normal. GCS eye subscore is 4. GCS verbal subscore is 5. GCS motor subscore is 6.   Skin: Skin is warm, dry, intact, not diaphoretic and not pale.   Psychiatric: She has a  normal mood and affect. Her speech is normal and behavior is normal. Judgment and thought content normal. Cognition and memory are normal.   Nursing note and vitals reviewed.        Assessment:       1. Frequent urination    2. Dysuria    3. Acute low back pain without sciatica, unspecified back pain laterality        Plan:       Patient presents with dysuria.. No leukocytes or nitrites on urinalysis in clinic. She has no CVA or abdominal tenderness to suggest pyelonephritis, nephrolithiasis. Patient appears well hydrated and nontoxic. Will send urine for culture and treat with keflex while awaiting culture. Patient advised that if urine culture is unremarkable, she will require follow up with urology.      Frequent urination  -     POCT Urinalysis, Dipstick, Automated, W/O Scope    Dysuria  -     Culture, Urine    Acute low back pain without sciatica, unspecified back pain laterality    Other orders  -     cephALEXin (KEFLEX) 500 MG capsule; Take 1 capsule (500 mg total) by mouth every 12 (twelve) hours. for 7 days  Dispense: 14 capsule; Refill: 0  -     phenazopyridine (PYRIDIUM) 200 MG tablet; Take 1 tablet (200 mg total) by mouth 3 (three) times daily as needed for Pain.  Dispense: 12 tablet; Refill: 0

## 2019-11-09 LAB
BACTERIA UR CULT: NO GROWTH
BACTERIA UR CULT: NORMAL

## 2019-11-18 PROBLEM — E78.2 MIXED HYPERLIPIDEMIA: Status: ACTIVE | Noted: 2019-11-18

## 2019-11-18 PROBLEM — E03.9 HYPOTHYROIDISM: Status: ACTIVE | Noted: 2019-11-18

## 2019-11-18 PROBLEM — S83.242A TEAR OF MEDIAL MENISCUS OF LEFT KNEE, CURRENT, INITIAL ENCOUNTER: Status: RESOLVED | Noted: 2019-08-29 | Resolved: 2019-11-18

## 2019-11-18 PROBLEM — J30.9 CHRONIC ALLERGIC RHINITIS: Status: ACTIVE | Noted: 2019-11-18

## 2019-11-18 PROBLEM — R26.9 GAIT ABNORMALITY: Status: RESOLVED | Noted: 2019-09-12 | Resolved: 2019-11-18

## 2019-11-18 PROBLEM — S83.232A COMPLEX TEAR OF MEDIAL MENISCUS OF LEFT KNEE AS CURRENT INJURY: Status: RESOLVED | Noted: 2019-09-12 | Resolved: 2019-11-18

## 2019-11-18 PROBLEM — Z87.828 HISTORY OF MENISCAL TEAR: Status: ACTIVE | Noted: 2019-11-18

## 2021-05-12 ENCOUNTER — PATIENT MESSAGE (OUTPATIENT)
Dept: RESEARCH | Facility: HOSPITAL | Age: 61
End: 2021-05-12

## 2021-08-06 ENCOUNTER — OFFICE VISIT (OUTPATIENT)
Dept: URGENT CARE | Facility: CLINIC | Age: 61
End: 2021-08-06

## 2021-08-06 VITALS
DIASTOLIC BLOOD PRESSURE: 81 MMHG | HEART RATE: 67 BPM | HEIGHT: 61 IN | TEMPERATURE: 98 F | WEIGHT: 166.19 LBS | SYSTOLIC BLOOD PRESSURE: 125 MMHG | OXYGEN SATURATION: 97 % | BODY MASS INDEX: 31.38 KG/M2

## 2021-08-06 DIAGNOSIS — J00 NASOPHARYNGITIS: ICD-10-CM

## 2021-08-06 DIAGNOSIS — R05.9 COUGH: Primary | ICD-10-CM

## 2021-08-06 DIAGNOSIS — Z20.822 COVID-19 VIRUS NOT DETECTED: ICD-10-CM

## 2021-08-06 LAB
CTP QC/QA: YES
CTP QC/QA: YES
S PYO RRNA THROAT QL PROBE: NEGATIVE
SARS-COV-2 RDRP RESP QL NAA+PROBE: NEGATIVE

## 2021-08-06 PROCEDURE — U0002 COVID-19 LAB TEST NON-CDC: HCPCS | Mod: QW,S$GLB,, | Performed by: NURSE PRACTITIONER

## 2021-08-06 PROCEDURE — 87880 STREP A ASSAY W/OPTIC: CPT | Mod: QW,,, | Performed by: NURSE PRACTITIONER

## 2021-08-06 PROCEDURE — 99213 PR OFFICE/OUTPT VISIT, EST, LEVL III, 20-29 MIN: ICD-10-PCS | Mod: 25,S$GLB,, | Performed by: NURSE PRACTITIONER

## 2021-08-06 PROCEDURE — U0002: ICD-10-PCS | Mod: QW,S$GLB,, | Performed by: NURSE PRACTITIONER

## 2021-08-06 PROCEDURE — 87880 POCT RAPID STREP A: ICD-10-PCS | Mod: QW,,, | Performed by: NURSE PRACTITIONER

## 2021-08-06 PROCEDURE — 99213 OFFICE O/P EST LOW 20 MIN: CPT | Mod: 25,S$GLB,, | Performed by: NURSE PRACTITIONER

## 2021-08-06 RX ORDER — BENZONATATE 200 MG/1
200 CAPSULE ORAL EVERY 8 HOURS PRN
Qty: 30 CAPSULE | Refills: 0 | Status: SHIPPED | OUTPATIENT
Start: 2021-08-06

## 2021-08-06 RX ORDER — CETIRIZINE HYDROCHLORIDE 10 MG/1
10 TABLET ORAL DAILY
Qty: 30 TABLET | Refills: 0 | Status: SHIPPED | OUTPATIENT
Start: 2021-08-06

## 2021-08-06 RX ORDER — FLUTICASONE PROPIONATE 50 MCG
1 SPRAY, SUSPENSION (ML) NASAL DAILY
Qty: 11.1 ML | Refills: 0 | Status: SHIPPED | OUTPATIENT
Start: 2021-08-06

## 2022-04-15 NOTE — ANESTHESIA POSTPROCEDURE EVALUATION
Anesthesia Post Evaluation    Patient: Akua Anderson    Procedure(s) Performed: Procedure(s) (LRB):  ARTHROSCOPY, KNEE (Left)  REPAIR, MENISCUS, KNEE (Left)    Final Anesthesia Type: general  Patient location during evaluation: PACU  Patient participation: Yes- Able to Participate  Level of consciousness: awake and alert  Post-procedure vital signs: reviewed and stable  Pain management: adequate  Airway patency: patent  PONV status at discharge: No PONV  Anesthetic complications: no      Cardiovascular status: blood pressure returned to baseline  Respiratory status: unassisted  Hydration status: euvolemic  Follow-up not needed.          Vitals Value Taken Time   /64 9/4/2019 11:18 AM   Temp 36.6 °C (97.9 °F) 9/4/2019 10:25 AM   Pulse 59 9/4/2019 11:18 AM   Resp 14 9/4/2019 11:18 AM   SpO2 100 % 9/4/2019 11:18 AM         Event Time     Out of Recovery 10:49:00          Pain/Mirian Score: Pain Rating Prior to Med Admin: 6 (9/4/2019 10:25 AM)  Pain Rating Post Med Admin: 4 (9/4/2019 10:35 AM)  Mirian Score: 10 (9/4/2019 10:35 AM)         No Repair - Repaired With Adjacent Surgical Defect Text (Leave Blank If You Do Not Want): After obtaining clear surgical margins the defect was repaired concurrently with another surgical defect which was in close approximation.

## 2024-06-09 ENCOUNTER — HOSPITAL ENCOUNTER (EMERGENCY)
Facility: HOSPITAL | Age: 64
Discharge: HOME | End: 2024-06-09
Attending: EMERGENCY MEDICINE
Payer: COMMERCIAL

## 2024-06-09 VITALS
BODY MASS INDEX: 32.59 KG/M2 | TEMPERATURE: 98 F | WEIGHT: 166 LBS | HEART RATE: 62 BPM | SYSTOLIC BLOOD PRESSURE: 139 MMHG | RESPIRATION RATE: 18 BRPM | OXYGEN SATURATION: 95 % | DIASTOLIC BLOOD PRESSURE: 74 MMHG | HEIGHT: 60 IN

## 2024-06-09 DIAGNOSIS — L02.91 ABSCESS: ICD-10-CM

## 2024-06-09 DIAGNOSIS — L03.011 CELLULITIS OF FINGER OF RIGHT HAND: Primary | ICD-10-CM

## 2024-06-09 PROCEDURE — 63600175 PHARM REV CODE 636 W HCPCS: Performed by: NURSE PRACTITIONER

## 2024-06-09 PROCEDURE — 87077 CULTURE AEROBIC IDENTIFY: CPT | Performed by: EMERGENCY MEDICINE

## 2024-06-09 PROCEDURE — 99284 EMERGENCY DEPT VISIT MOD MDM: CPT | Mod: 25

## 2024-06-09 PROCEDURE — 87186 SC STD MICRODIL/AGAR DIL: CPT | Performed by: EMERGENCY MEDICINE

## 2024-06-09 PROCEDURE — 96372 THER/PROPH/DIAG INJ SC/IM: CPT | Mod: 59 | Performed by: NURSE PRACTITIONER

## 2024-06-09 PROCEDURE — 87147 CULTURE TYPE IMMUNOLOGIC: CPT | Performed by: EMERGENCY MEDICINE

## 2024-06-09 PROCEDURE — 25000003 PHARM REV CODE 250: Performed by: NURSE PRACTITIONER

## 2024-06-09 PROCEDURE — 87070 CULTURE OTHR SPECIMN AEROBIC: CPT | Performed by: EMERGENCY MEDICINE

## 2024-06-09 PROCEDURE — 10060 I&D ABSCESS SIMPLE/SINGLE: CPT

## 2024-06-09 RX ORDER — LIDOCAINE AND PRILOCAINE 25; 25 MG/G; MG/G
CREAM TOPICAL
Status: COMPLETED | OUTPATIENT
Start: 2024-06-09 | End: 2024-06-09

## 2024-06-09 RX ORDER — CEFAZOLIN SODIUM 1 G/3ML
1 INJECTION, POWDER, FOR SOLUTION INTRAMUSCULAR; INTRAVENOUS
Status: COMPLETED | OUTPATIENT
Start: 2024-06-09 | End: 2024-06-09

## 2024-06-09 RX ORDER — CIPROFLOXACIN 500 MG/1
500 TABLET ORAL 2 TIMES DAILY
Qty: 14 TABLET | Refills: 0 | Status: SHIPPED | OUTPATIENT
Start: 2024-06-09 | End: 2024-06-16

## 2024-06-09 RX ORDER — MUPIROCIN 20 MG/G
OINTMENT TOPICAL 2 TIMES DAILY
Qty: 30 G | Refills: 0 | Status: SHIPPED | OUTPATIENT
Start: 2024-06-09 | End: 2024-06-16

## 2024-06-09 RX ORDER — LIDOCAINE HYDROCHLORIDE 10 MG/ML
10 INJECTION INFILTRATION; PERINEURAL
Status: COMPLETED | OUTPATIENT
Start: 2024-06-09 | End: 2024-06-09

## 2024-06-09 RX ORDER — DOXYCYCLINE 100 MG/1
100 CAPSULE ORAL 2 TIMES DAILY
Qty: 14 CAPSULE | Refills: 0 | Status: SHIPPED | OUTPATIENT
Start: 2024-06-09 | End: 2024-06-16

## 2024-06-09 RX ORDER — CEPHALEXIN 250 MG/1
500 CAPSULE ORAL
Status: COMPLETED | OUTPATIENT
Start: 2024-06-09 | End: 2024-06-09

## 2024-06-09 RX ADMIN — LIDOCAINE HYDROCHLORIDE 10 ML: 10 INJECTION, SOLUTION INFILTRATION; PERINEURAL at 11:06

## 2024-06-09 RX ADMIN — LIDOCAINE AND PRILOCAINE: 25; 25 CREAM TOPICAL at 11:06

## 2024-06-09 RX ADMIN — CEPHALEXIN 500 MG: 250 CAPSULE ORAL at 01:06

## 2024-06-09 RX ADMIN — CEFAZOLIN 1 G: 1 INJECTION, POWDER, FOR SOLUTION INTRAVENOUS at 02:06

## 2024-06-09 NOTE — ED NOTES
Pt identifiers checked and accurate with Akua Anderson     Pt presents to ED with complaints of right 5th finger swelling with redness since Tuesday. PT reports gardening and getting splinter, started taking amoxicillin left over at home since Thursday. Pain, redness and swelling worsening over the weekend with fever last night. Pt denies all other complaints at this time.

## 2024-06-09 NOTE — Clinical Note
"Akua Xavier" Abdelrahman Anderson was seen and treated in our emergency department on 6/9/2024.  She may return to work on 06/15/2024.       If you have any questions or concerns, please don't hesitate to call.      Bharti VALENZUELA    "

## 2024-06-09 NOTE — Clinical Note
"Akua Xavier" Abdelrahman Anderson was seen and treated in our emergency department on 6/9/2024.  She may return to work on 06/17/2024.       If you have any questions or concerns, please don't hesitate to call.      Bharti VALENZUELA    "

## 2024-06-09 NOTE — ED PROVIDER NOTES
Encounter Date: 2024       History     Chief Complaint   Patient presents with    Wound Infection     Rt. 5 th finger / splinter in finger , taking antibiotics      Patient is a 64 y.o. female who presents to the ED 2024 who underwent emergent evaluation for Wound infection.  Patient states 6 days ago she was gardening and got a small splinter in her little finger.  She states it was so so small and microscopic that she did not try to remove it.  She states however over the last few days it is become more red and swollen in the area.  She states she did stick something in it and tried to remove it but could not.  She did start taking amoxicillin that she had but that is not helped either.  She has a history of hypothyroidism.  She denies any other medical problems such as diabetes.  She denies any allergies.             Review of patient's allergies indicates:  No Known Allergies  Past Medical History:   Diagnosis Date    Clavicle fracture     Hypothyroidism      Past Surgical History:   Procedure Laterality Date    ARTHROSCOPY OF KNEE Left 2019    Procedure: ARTHROSCOPY, KNEE;  Surgeon: Nahum Espinosa II, MD;  Location: [facility] OR;  Service: Orthopedics;  Laterality: Left;    CERVICAL SPINE SURGERY      30 yrs ago     SECTION      DILATION AND CURETTAGE OF UTERUS      REPAIR OF MENISCUS OF KNEE Left 2019    Procedure: REPAIR, MENISCUS, KNEE;  Surgeon: Nahum Espinosa II, MD;  Location: [facility] OR;  Service: Orthopedics;  Laterality: Left;    TONSILLECTOMY       Family History   Problem Relation Name Age of Onset    Arthritis Mother       Social History     Tobacco Use    Smoking status: Never    Smokeless tobacco: Never   Substance Use Topics    Alcohol use: No    Drug use: No     Review of Systems   Constitutional:  Negative for chills and fever.   Respiratory:  Negative for chest tightness and shortness of breath.    Cardiovascular:  Negative for chest pain.   Gastrointestinal:  Negative  for abdominal pain.   Genitourinary:  Negative for dysuria.   Musculoskeletal:  Negative for arthralgias and myalgias.   Skin:  Positive for wound. Negative for rash.   Neurological:  Negative for syncope.   Hematological:  Does not bruise/bleed easily.       Physical Exam     Initial Vitals [06/09/24 1010]   BP Pulse Resp Temp SpO2   139/74 62 18 98.2 °F (36.8 °C) 95 %      MAP       --         Physical Exam    Nursing note and vitals reviewed.  Constitutional: Vital signs are normal. She appears well-developed and well-nourished.   HENT:   Head: Normocephalic and atraumatic.   Eyes: Pupils are equal, round, and reactive to light.   Neck: Neck supple.   Cardiovascular:  Normal rate, regular rhythm, normal heart sounds and intact distal pulses.     Exam reveals no gallop and no friction rub.       No murmur heard.  Pulmonary/Chest: Breath sounds normal. She has no wheezes. She has no rhonchi. She has no rales.   Musculoskeletal:      Right hand: Swelling, tenderness and bony tenderness present. No deformity or lacerations. Decreased range of motion. Normal strength. Normal sensation. There is no disruption of two-point discrimination. Normal capillary refill. Normal pulse.      Cervical back: Neck supple.      Comments: Right small finger with swelling and erythema and pustule to the lateral aspect of the finger between the D IP and PIP joints.  She is able to flex and extend at the DIP eye and PIP joints with no tenderness over the flexor surface of the finger. Negative kanavel signs. Brisk capillary refill to the finger.  No fusiform or generalized swelling.  Swelling is localized to the lateral aspect of the finger.     Neurological: She is alert and oriented to person, place, and time. She has normal strength.   Skin: Skin is warm, dry and intact. Capillary refill takes less than 2 seconds.   Psychiatric: She has a normal mood and affect. Her speech is normal and behavior is normal.         ED Course   I & D -  "Incision and Drainage    Date/Time: 6/9/2024 9:50 AM  Location procedure was performed: [facility] EMERGENCY DEPARTMENT    Performed by: Laura Yanez NP  Authorized by: Azar Villeda MD  Consent Done: Yes  Consent: Verbal consent obtained.  Risks and benefits: risks, benefits and alternatives were discussed  Consent given by: patient  Patient understanding: patient states understanding of the procedure being performed  Imaging studies: imaging studies available  Patient identity confirmed: name and verbally with patient  Time out: Immediately prior to procedure a "time out" was called to verify the correct patient, procedure, equipment, support staff and site/side marked as required.  Type: abscess  Body area: upper extremity  Location details: right small finger  Anesthesia: nerve block    Anesthesia:  Local Anesthetic: lidocaine 1% without epinephrine  Anesthetic total: 2 mL    Patient sedated: no  Scalpel size: 11  Incision type: single straight  Incision depth: dermal  Complexity: simple  Drainage: pus  Drainage amount: scant  Wound treatment: incision, drainage, deloculation and expression of material  Complications: No  Specimens: Yes  Implants: No  Patient tolerance: Patient tolerated the procedure well with no immediate complications    Incision depth: dermal        Labs Reviewed   CULTURE, AEROBIC  (SPECIFY SOURCE)          Imaging Results              X-Ray Finger 2 or More Views Right (Final result)  Result time 06/09/24 11:43:18      Final result by Donato Freeman MD (06/09/24 11:43:18)                   Impression:      1. Soft tissue swelling without evidence of any radiopaque foreign body, fracture, or dislocation.      Electronically signed by: Donato Freeman  Date:    06/09/2024  Time:    11:43               Narrative:    EXAMINATION:  XR FINGER 2 OR MORE VIEWS RIGHT    CLINICAL HISTORY:  pain;foreign body;infection;    TECHNIQUE:  Three views of the right little " finger.    COMPARISON:  None    FINDINGS:  Prominent regional soft tissue swelling.  No radiopaque foreign body.  No fracture or dislocation.  No abnormal bony lucencies.                                       Medications   LIDOcaine-prilocaine cream ( Topical (Top) Given 6/9/24 1107)   LIDOcaine HCL 10 mg/ml (1%) injection 10 mL (10 mLs Infiltration Given 6/9/24 1107)   cephALEXin capsule 500 mg (500 mg Oral Given 6/9/24 1305)   ceFAZolin injection 1 g (1 g Intramuscular Given 6/9/24 1400)     Medical Decision Making  Amount and/or Complexity of Data Reviewed  Radiology: ordered.    Risk  Prescription drug management.         APC / Resident Notes:   Patient is a 64 y.o. female who presents to the ED 06/09/2024 who Underwent emergent evaluation for pain to right small finger.  Patient reports possible small splinter earlier this week.  No evidence of foreign body with wound exploration and I and D and expression.  There is some scant pus with expression.  Discussed risk versus benefit of I and D prior to I and D in the ED and patient agreeable and requesting I&D.  Discussed deferment of I and D to specialist however patient wishes to proceed with I and D today which I believe is reasonable as I am concerned for abscess.  I do not think flexor tenosynovitis.  Patient is able to flex and extend the finger.  No circumferential swelling and brisk capillary refill to the distal finger with no signs of distal neurovascular compromise.  She has no systemic signs of infection.  There is no evidence of retained foreign body on exploration of the wound or on x-ray or on bedside ultrasound.  Did discussed possibility of remaining retained foreign body with patient.  She is given IM and p.o. antibiotics.  She is given strict return precautions.  Vital signs normal.  Recommend very close wound check either with primary care which an appointment is made for this and or hand surgery or Orthopedics.  Also discussed return to the ED  for any new or worsening symptoms.  Her tetanus is up-to-date. Based on my clinical evaluation, I do not appreciate any immediate, emergent, or life threatening condition or etiology that warrants additional workup today and feel that the patient can be discharged with close follow up care. Case discussed with Dr. Villeda who also evaluated pt and the bedside and who is agreeable to plan of care. Follow up and return precautions discussed; patient verbalized understanding and is agreeable to plan of care. Patient discharged home in stable condition.                             Medical Decision Making:   Differential Diagnosis:   Flexor tenosynovitis  Cellulitis  Abscess  fb             Clinical Impression:  Final diagnoses:  [L03.011] Cellulitis of finger of right hand (Primary)  [L02.91] Abscess          ED Disposition Condition    Discharge Stable          ED Prescriptions       Medication Sig Dispense Start Date End Date Auth. Provider    ciprofloxacin HCl (CIPRO) 500 MG tablet Take 1 tablet (500 mg total) by mouth 2 (two) times daily. for 7 days 14 tablet 6/9/2024 6/16/2024 Laura Yanez NP    doxycycline (VIBRAMYCIN) 100 MG Cap Take 1 capsule (100 mg total) by mouth 2 (two) times daily. for 7 days 14 capsule 6/9/2024 6/16/2024 Laura Yanez NP    mupirocin (BACTROBAN) 2 % ointment Apply topically 2 (two) times daily. for 7 days 30 g 6/9/2024 6/16/2024 Laura Yanez NP          Follow-up Information       Follow up With Specialties Details Why Contact Info Additional Information    Wellington Marr MD Orthopedic Surgery In 2 days For wound re-check [address]  [address]  [facility] ORTHOP[facility]  [facility] [address]  [phone]       Popeye Seals MD Hand Surgery In 2 days For wound re-check [address]  [address]  [facility] [address]  [phone]       [facility] Emergency Medicine In 2 days As needed, If symptoms worsen, For wound re-check [address] [place] [address] 1st floor             Laura Yanez, CHAPINCITO  06/09/24 [id number]

## 2024-06-09 NOTE — DISCHARGE INSTRUCTIONS
Warm compress as discussed. Return for worsening symptoms as discussed. You may have a retained foreign body in your finger.

## 2024-06-11 ENCOUNTER — OFFICE VISIT (OUTPATIENT)
Dept: FAMILY MEDICINE | Facility: CLINIC | Age: 64
End: 2024-06-11
Payer: COMMERCIAL

## 2024-06-11 VITALS
OXYGEN SATURATION: 97 % | TEMPERATURE: 98 F | BODY MASS INDEX: 32.85 KG/M2 | WEIGHT: 167.31 LBS | DIASTOLIC BLOOD PRESSURE: 70 MMHG | HEIGHT: 60 IN | HEART RATE: 69 BPM | SYSTOLIC BLOOD PRESSURE: 102 MMHG

## 2024-06-11 DIAGNOSIS — Z09 FOLLOW-UP EXAM: Primary | ICD-10-CM

## 2024-06-11 DIAGNOSIS — L03.011 CELLULITIS OF FINGER OF RIGHT HAND: ICD-10-CM

## 2024-06-11 DIAGNOSIS — L02.91 ABSCESS: ICD-10-CM

## 2024-06-11 PROCEDURE — 3074F SYST BP LT 130 MM HG: CPT | Mod: CPTII,S$GLB,, | Performed by: NURSE PRACTITIONER

## 2024-06-11 PROCEDURE — 3078F DIAST BP <80 MM HG: CPT | Mod: CPTII,S$GLB,, | Performed by: NURSE PRACTITIONER

## 2024-06-11 PROCEDURE — 99999 PR PBB SHADOW E&M-EST. PATIENT-LVL V: CPT | Mod: PBBFAC,,, | Performed by: NURSE PRACTITIONER

## 2024-06-11 PROCEDURE — 3288F FALL RISK ASSESSMENT DOCD: CPT | Mod: CPTII,S$GLB,, | Performed by: NURSE PRACTITIONER

## 2024-06-11 PROCEDURE — 1101F PT FALLS ASSESS-DOCD LE1/YR: CPT | Mod: CPTII,S$GLB,, | Performed by: NURSE PRACTITIONER

## 2024-06-11 PROCEDURE — 1159F MED LIST DOCD IN RCRD: CPT | Mod: CPTII,S$GLB,, | Performed by: NURSE PRACTITIONER

## 2024-06-11 PROCEDURE — 1160F RVW MEDS BY RX/DR IN RCRD: CPT | Mod: CPTII,S$GLB,, | Performed by: NURSE PRACTITIONER

## 2024-06-11 PROCEDURE — 3008F BODY MASS INDEX DOCD: CPT | Mod: CPTII,S$GLB,, | Performed by: NURSE PRACTITIONER

## 2024-06-11 PROCEDURE — 99214 OFFICE O/P EST MOD 30 MIN: CPT | Mod: S$GLB,,, | Performed by: NURSE PRACTITIONER

## 2024-06-11 NOTE — PROGRESS NOTES
"Subjective:       Patient ID: Akua Anderson is a 64 y.o. female.    Chief Complaint: hospital follow up      HPI   63 y/o female patient with medical problems listed below presents for ED follow up. Patient is here with a daughter in law (Ms. Boles) who likes to interpret. Patient presented to ED on 6/9 for right 5 ft finger wound infection. Patient underwent I and D which did not show foreign body but there was some scant pus with expression. Patient underwent right finger x-ray which did not show retained foreign body. Patient was given IM and po antibiotics in ED. Patient was discharged with 7 day course of Cipro, Doxycycline, and Mupirocin oint. Patient states her left hand was red till yesterday but getting better today. States wound drains with greenish yellow discharges. States able to flex and extend the affected digit. Denies fever, chills, tingling or numbness, generalized body ache or weakness. States she contacted Dr. Seals hand surgery this Monday for the appointment but the provider was not available.     ED Note 6/9/2024  "Patient is a 64 y.o. female who presents to the ED 06/09/2024 who underwent emergent evaluation for Wound infection. Patient states 6 days ago she was gardening and got a small splinter in her little finger. She states it was so so small and microscopic that she did not try to remove it. She states however over the last few days it is become more red and swollen in the area. She states she did stick something in it and tried to remove it but could not. She did start taking amoxicillin that she had but that is not helped either. She has a history of hypothyroidism. She denies any other medical problems such as diabetes. She denies any allergies."    Right finger x-ray 6/9/2024  Impression:   1. Soft tissue swelling without evidence of any radiopaque foreign body, fracture, or dislocation.    Was worse till yesterday with redness   Drained yesterday with greening " yellowsihg feeling beeter. Uses betadine for dressing.     Labs reviewed   Patient Active Problem List   Diagnosis    Hypothyroidism    History of meniscal tear    Chronic allergic rhinitis    Mixed hyperlipidemia        Review of patient's allergies indicates:  No Known Allergies    Past Surgical History:   Procedure Laterality Date    ARTHROSCOPY OF KNEE Left 2019    Procedure: ARTHROSCOPY, KNEE;  Surgeon: Nahum Espinosa II, MD;  Location: [facility];  Service: Orthopedics;  Laterality: Left;    CERVICAL SPINE SURGERY      30 yrs ago     SECTION      DILATION AND CURETTAGE OF UTERUS      REPAIR OF MENISCUS OF KNEE Left 2019    Procedure: REPAIR, MENISCUS, KNEE;  Surgeon: Nahum Espinosa II, MD;  Location: [facility];  Service: Orthopedics;  Laterality: Left;    TONSILLECTOMY            Current Outpatient Medications:     benzonatate (TESSALON) 200 MG capsule, Take 1 capsule (200 mg total) by mouth every 8 (eight) hours as needed for Cough., Disp: 30 capsule, Rfl: 0    cetirizine (ZYRTEC) 10 MG tablet, Take 1 tablet (10 mg total) by mouth once daily., Disp: 30 tablet, Rfl: 0    ciprofloxacin HCl (CIPRO) 500 MG tablet, Take 1 tablet (500 mg total) by mouth 2 (two) times daily. for 7 days, Disp: 14 tablet, Rfl: 0    doxycycline (VIBRAMYCIN) 100 MG Cap, Take 1 capsule (100 mg total) by mouth 2 (two) times daily. for 7 days, Disp: 14 capsule, Rfl: 0    fluticasone propionate (FLONASE) 50 mcg/actuation nasal spray, 1 spray (50 mcg total) by Each Nostril route once daily., Disp: 11.1 mL, Rfl: 0    loratadine (CLARITIN) 10 mg tablet, Take 10 mg by mouth daily as needed. , Disp: , Rfl:     mupirocin (BACTROBAN) 2 % ointment, Apply topically 2 (two) times daily. for 7 days, Disp: 30 g, Rfl: 0    ibuprofen (ADVIL,MOTRIN) 800 MG tablet, Take 1 tablet (800 mg total) by mouth 3 (three) times daily. (Patient not taking: Reported on 2019), Disp: 90 tablet, Rfl: 1    levothyroxine (SYNTHROID) 112 MCG tablet, Take  1 tablet (112 mcg total) by mouth once daily., Disp: 90 tablet, Rfl: 0    Review of Systems   Constitutional:  Negative for chills and fever.   Respiratory:  Negative for cough and shortness of breath.    Cardiovascular:  Negative for chest pain and palpitations.   Gastrointestinal:  Negative for abdominal pain.   Skin:  Positive for color change and wound.   Neurological:  Negative for dizziness and headaches.       Objective:   /70 (BP Location: Right arm, Patient Position: Sitting, BP Method: Large (Manual))   Pulse 69   Temp 98 °F (36.7 °C) (Oral)   Ht 5' (1.524 m)   Wt 75.9 kg (167 lb 5.3 oz)   SpO2 97%   BMI 32.68 kg/m²         Physical Exam  Constitutional:       General: She is not in acute distress.     Appearance: Normal appearance.   HENT:      Head: Atraumatic.   Cardiovascular:      Rate and Rhythm: Normal rate and regular rhythm.      Pulses: Normal pulses.      Heart sounds: Normal heart sounds.   Pulmonary:      Effort: Pulmonary effort is normal.      Breath sounds: Normal breath sounds.   Abdominal:      General: Abdomen is flat. Bowel sounds are normal.      Palpations: Abdomen is soft.   Musculoskeletal:      Comments: +Swelling and mild erythema and pustule to the lateral aspect of the right 5ft finger between the DIP and PIP joint.   Neurological:      Mental Status: She is oriented to person, place, and time.           Assessment:       1. Abscess    2. Cellulitis of finger of right hand    3. Follow-up exam        Plan:       1. Abscess  - Ambulatory referral/consult to Hand Surgery; Future    2. Cellulitis of finger of right hand  - Encouraged to complete 7 day course of antibiotic  - Ambulatory referral/consult to Hand Surgery; Future  - Advised to go to ED if the symptoms worsen including increasing pain, marked redness, fever, chills, generalized body ache or weakness or any abnormal worsening symptoms     3. Follow-up exam    Patient with be reevaluated in  follow up with pcp   or sooner pierre Garcia NP

## 2024-06-12 LAB — BACTERIA SPEC AEROBE CULT: ABNORMAL

## 2024-09-03 ENCOUNTER — TELEPHONE (OUTPATIENT)
Dept: FAMILY MEDICINE | Facility: CLINIC | Age: 64
End: 2024-09-03
Payer: COMMERCIAL

## 2024-09-03 NOTE — TELEPHONE ENCOUNTER
LM pt call clinic to reschedule appt. She check in to late. And PA prefers office appt     Portal message sent

## 2025-06-20 ENCOUNTER — OFFICE VISIT (OUTPATIENT)
Dept: URGENT CARE | Facility: CLINIC | Age: 65
End: 2025-06-20
Payer: COMMERCIAL

## 2025-06-20 VITALS
OXYGEN SATURATION: 97 % | RESPIRATION RATE: 20 BRPM | WEIGHT: 167 LBS | BODY MASS INDEX: 32.79 KG/M2 | SYSTOLIC BLOOD PRESSURE: 116 MMHG | DIASTOLIC BLOOD PRESSURE: 78 MMHG | HEIGHT: 60 IN | TEMPERATURE: 98 F | HEART RATE: 67 BPM

## 2025-06-20 DIAGNOSIS — L03.011 PARONYCHIA OF FINGER OF RIGHT HAND: ICD-10-CM

## 2025-06-20 DIAGNOSIS — M79.644 FINGER PAIN, RIGHT: Primary | ICD-10-CM

## 2025-06-20 PROCEDURE — 73140 X-RAY EXAM OF FINGER(S): CPT | Mod: RT,S$GLB,, | Performed by: RADIOLOGY

## 2025-06-20 RX ORDER — CEPHALEXIN 500 MG/1
500 CAPSULE ORAL EVERY 6 HOURS
Qty: 28 CAPSULE | Refills: 0 | Status: SHIPPED | OUTPATIENT
Start: 2025-06-20 | End: 2025-06-27

## 2025-06-20 NOTE — PROGRESS NOTES
Subjective:      Patient ID: Akua Anderson is a 65 y.o. female.    Vitals:  height is 5' (1.524 m) and weight is 75.8 kg (167 lb). Her oral temperature is 98.3 °F (36.8 °C). Her blood pressure is 116/78 and her pulse is 67. Her respiration is 20 and oxygen saturation is 97%.     Chief Complaint: Finger Pain    Last night pt noticed her right middle finger was swollen and painful.  Last tetanus 10/2024.  Works as  in restaurant using metal bristle brushes, concerned for metal bristle stuck in finger area of tenderness.    Finger Pain  The current episode started yesterday. Pertinent negatives include no chills or fever.       Constitution: Negative for chills and fever.   Musculoskeletal:  Positive for pain (tip of right middle finger next to nail). Negative for trauma and abnormal ROM of joint.   Skin:  Positive for erythema (end of right middle finger). Negative for color change, wound, lesion, puncture wound, bruising and abscess.      Objective:     Physical Exam   Constitutional: She is oriented to person, place, and time.  Non-toxic appearance. She does not appear ill. No distress.   HENT:   Head: Normocephalic and atraumatic.   Nose: Nose normal.   Mouth/Throat: Mucous membranes are moist.   Eyes: Conjunctivae are normal.   Cardiovascular: Normal rate.   Pulmonary/Chest: Effort normal. No respiratory distress.   Abdominal: Normal appearance.   Musculoskeletal: Normal range of motion.         General: Swelling and tenderness present. No deformity or signs of injury. Normal range of motion.      Comments: Mild edema and ttp cuticle right middle finger radial aspect of nail.  No obvious purulent collection to require I&D at this time.    Neurological: no focal deficit. She is alert and oriented to person, place, and time.   Skin: Skin is warm, dry and no rash. Capillary refill takes 2 to 3 seconds. erythema (end of right middle finger) No bruising and No lesion   Psychiatric: Her  behavior is normal. Mood normal.   Nursing note and vitals reviewed.              Assessment:     1. Finger pain, right    2. Paronychia of finger of right hand      Right middle finger xray:  FINDINGS:  No fracture or dislocation.  There is mild soft tissue swelling distally.  No radiopaque foreign body.  There is mild degenerative change of the distal interphalangeal joint.     Impression:     Soft tissue swelling without radiopaque foreign body.    Plan:       Finger pain, right  -     X-Ray Finger 2 or More Views Right; Future; Expected date: 06/20/2025    Paronychia of finger of right hand  -     cephALEXin (KEFLEX) 500 MG capsule; Take 1 capsule (500 mg total) by mouth every 6 (six) hours. for 7 days  Dispense: 28 capsule; Refill: 0                Emerging paronychia right middle finger, no purulent material collection with flatulence identified to require I&D at this time.  Instructions to return if occurs.

## 2025-09-04 ENCOUNTER — OFFICE VISIT (OUTPATIENT)
Dept: ENDOCRINOLOGY | Facility: CLINIC | Age: 65
End: 2025-09-04
Payer: COMMERCIAL

## 2025-09-04 VITALS
HEART RATE: 69 BPM | HEIGHT: 61 IN | DIASTOLIC BLOOD PRESSURE: 78 MMHG | SYSTOLIC BLOOD PRESSURE: 120 MMHG | BODY MASS INDEX: 31.7 KG/M2 | WEIGHT: 167.88 LBS | RESPIRATION RATE: 16 BRPM

## 2025-09-04 DIAGNOSIS — E03.9 HYPOTHYROIDISM, UNSPECIFIED TYPE: ICD-10-CM

## 2025-09-04 DIAGNOSIS — E78.2 MIXED HYPERLIPIDEMIA: Primary | ICD-10-CM

## 2025-09-04 PROCEDURE — 99999 PR PBB SHADOW E&M-EST. PATIENT-LVL IV: CPT | Mod: PBBFAC,,, | Performed by: STUDENT IN AN ORGANIZED HEALTH CARE EDUCATION/TRAINING PROGRAM

## 2025-09-04 PROCEDURE — 3074F SYST BP LT 130 MM HG: CPT | Mod: CPTII,S$GLB,, | Performed by: STUDENT IN AN ORGANIZED HEALTH CARE EDUCATION/TRAINING PROGRAM

## 2025-09-04 PROCEDURE — 1101F PT FALLS ASSESS-DOCD LE1/YR: CPT | Mod: CPTII,S$GLB,, | Performed by: STUDENT IN AN ORGANIZED HEALTH CARE EDUCATION/TRAINING PROGRAM

## 2025-09-04 PROCEDURE — 1159F MED LIST DOCD IN RCRD: CPT | Mod: CPTII,S$GLB,, | Performed by: STUDENT IN AN ORGANIZED HEALTH CARE EDUCATION/TRAINING PROGRAM

## 2025-09-04 PROCEDURE — G2211 COMPLEX E/M VISIT ADD ON: HCPCS | Mod: S$GLB,,, | Performed by: STUDENT IN AN ORGANIZED HEALTH CARE EDUCATION/TRAINING PROGRAM

## 2025-09-04 PROCEDURE — 1126F AMNT PAIN NOTED NONE PRSNT: CPT | Mod: CPTII,S$GLB,, | Performed by: STUDENT IN AN ORGANIZED HEALTH CARE EDUCATION/TRAINING PROGRAM

## 2025-09-04 PROCEDURE — 3008F BODY MASS INDEX DOCD: CPT | Mod: CPTII,S$GLB,, | Performed by: STUDENT IN AN ORGANIZED HEALTH CARE EDUCATION/TRAINING PROGRAM

## 2025-09-04 PROCEDURE — 3078F DIAST BP <80 MM HG: CPT | Mod: CPTII,S$GLB,, | Performed by: STUDENT IN AN ORGANIZED HEALTH CARE EDUCATION/TRAINING PROGRAM

## 2025-09-04 PROCEDURE — 3288F FALL RISK ASSESSMENT DOCD: CPT | Mod: CPTII,S$GLB,, | Performed by: STUDENT IN AN ORGANIZED HEALTH CARE EDUCATION/TRAINING PROGRAM

## 2025-09-04 PROCEDURE — 99204 OFFICE O/P NEW MOD 45 MIN: CPT | Mod: S$GLB,,, | Performed by: STUDENT IN AN ORGANIZED HEALTH CARE EDUCATION/TRAINING PROGRAM

## (undated) DEVICE — APPLICATOR CHLORAPREP ORN 26ML

## (undated) DEVICE — SEE MEDLINE ITEM 152487

## (undated) DEVICE — NDL BOX COUNTER

## (undated) DEVICE — STRAP OR TABLE 5IN X 72IN

## (undated) DEVICE — REAMER LOW PROFILE 6MM STRL

## (undated) DEVICE — CUTTER AGGRESSIVE PLUS 3.5MM

## (undated) DEVICE — SEE MEDLINE ITEM 152622

## (undated) DEVICE — CUTTER MENISCUS AGGRESSIVE 4.0

## (undated) DEVICE — BANDAGE ESMARK 6X12

## (undated) DEVICE — DRAPE STERI INSTRUMENT 1018

## (undated) DEVICE — TOURNIQUET SB QC DP 34X4IN

## (undated) DEVICE — PACK BASIC

## (undated) DEVICE — GLOVE SURG ULTRA TOUCH 7.5

## (undated) DEVICE — PASSER SUTURE SCORPION 3.2MM

## (undated) DEVICE — BLADE SURG CARBON STEEL SZ11

## (undated) DEVICE — SEE MEDLINE ITEM 146231

## (undated) DEVICE — SEE MEDLINE ITEM 146313

## (undated) DEVICE — PROBE ABLATION RF ARTHSCP 3.5

## (undated) DEVICE — SUT ETHILON 3-0 PS2 18 BLK

## (undated) DEVICE — ALCOHOL 70% ISOP RUBBING 4OZ

## (undated) DEVICE — COVER SURG LIGHT HANDLE

## (undated) DEVICE — SEE MEDLINE ITEM 157216

## (undated) DEVICE — DRAPE STERI U-SHAPED 47X51IN

## (undated) DEVICE — PAD CAST SPECIALIST STRL 6

## (undated) DEVICE — GLOVE SURG ULTRA TOUCH 8.5

## (undated) DEVICE — SUT FIBERWIRE 2-0 50 BLK

## (undated) DEVICE — CUBE COLD THERAPY POLAR CARE

## (undated) DEVICE — SEE MEDLINE ITEM 157171

## (undated) DEVICE — SYS LABEL CORRECT MED

## (undated) DEVICE — SKINMARKER W/RULER DEVON

## (undated) DEVICE — SUT 2-0 FIBERWIRE

## (undated) DEVICE — SEE MEDLINE ITEM 157118

## (undated) DEVICE — SPONGE SUPER KERLIX 6X6.75IN

## (undated) DEVICE — CONTAINER SPECIMEN STRL 4OZ

## (undated) DEVICE — MANIFOLD 4 PORT

## (undated) DEVICE — SLEEVE SCD EXPRESS CALF MEDIUM

## (undated) DEVICE — NDL SPINAL 18GX3.5 SPINOCAN

## (undated) DEVICE — SOL IRR NACL .9% 3000ML

## (undated) DEVICE — SPONGE GAUZE 16PLY 4X4

## (undated) DEVICE — SEE MEDLINE ITEM 157117

## (undated) DEVICE — SEE MEDLINE ITEM 152530

## (undated) DEVICE — DRESSING XEROFORM FOIL PK 1X8

## (undated) DEVICE — GLOVE SURG ULTRA TOUCH 8

## (undated) DEVICE — MAT QUICK 40X30 FLOOR FLUID LF

## (undated) DEVICE — TUBING FLOSTEADY ARTHROSCOPY

## (undated) DEVICE — PADDING CAST SPECIALIST 6X4YD

## (undated) DEVICE — UNDERGLOVES BIOGEL PI SIZE 8

## (undated) DEVICE — KNEE IMMB UNV FOAM/MESH 20IN

## (undated) DEVICE — DRAPE PLASTIC U 60X72

## (undated) DEVICE — REPAIR KIT SMALL JOINT BIO TEN